# Patient Record
Sex: FEMALE | Race: WHITE | NOT HISPANIC OR LATINO
[De-identification: names, ages, dates, MRNs, and addresses within clinical notes are randomized per-mention and may not be internally consistent; named-entity substitution may affect disease eponyms.]

---

## 2017-05-05 PROBLEM — Z00.00 ENCOUNTER FOR PREVENTIVE HEALTH EXAMINATION: Status: ACTIVE | Noted: 2017-05-05

## 2017-05-16 ENCOUNTER — APPOINTMENT (OUTPATIENT)
Dept: HEMATOLOGY ONCOLOGY | Facility: CLINIC | Age: 82
End: 2017-05-16

## 2017-05-26 ENCOUNTER — OTHER (OUTPATIENT)
Age: 82
End: 2017-05-26

## 2017-06-01 ENCOUNTER — APPOINTMENT (OUTPATIENT)
Dept: HEMATOLOGY ONCOLOGY | Facility: CLINIC | Age: 82
End: 2017-06-01

## 2017-06-01 VITALS
OXYGEN SATURATION: 100 % | DIASTOLIC BLOOD PRESSURE: 55 MMHG | HEIGHT: 63.98 IN | WEIGHT: 137 LBS | SYSTOLIC BLOOD PRESSURE: 138 MMHG | HEART RATE: 57 BPM | TEMPERATURE: 98.7 F | BODY MASS INDEX: 23.39 KG/M2 | RESPIRATION RATE: 16 BRPM

## 2017-06-01 DIAGNOSIS — Z78.9 OTHER SPECIFIED HEALTH STATUS: ICD-10-CM

## 2017-06-02 PROBLEM — Z78.9 NON-SMOKER: Status: ACTIVE | Noted: 2017-06-02

## 2017-06-22 ENCOUNTER — APPOINTMENT (OUTPATIENT)
Dept: HEMATOLOGY ONCOLOGY | Facility: CLINIC | Age: 82
End: 2017-06-22

## 2017-06-22 VITALS
OXYGEN SATURATION: 100 % | TEMPERATURE: 97.9 F | SYSTOLIC BLOOD PRESSURE: 116 MMHG | BODY MASS INDEX: 23.05 KG/M2 | HEIGHT: 63.98 IN | WEIGHT: 134.99 LBS | RESPIRATION RATE: 16 BRPM | HEART RATE: 59 BPM | DIASTOLIC BLOOD PRESSURE: 54 MMHG

## 2017-07-05 ENCOUNTER — CHART COPY (OUTPATIENT)
Age: 82
End: 2017-07-05

## 2017-07-05 ENCOUNTER — OTHER (OUTPATIENT)
Age: 82
End: 2017-07-05

## 2017-07-12 ENCOUNTER — RX RENEWAL (OUTPATIENT)
Age: 82
End: 2017-07-12

## 2017-07-18 ENCOUNTER — RX RENEWAL (OUTPATIENT)
Age: 82
End: 2017-07-18

## 2017-07-20 ENCOUNTER — APPOINTMENT (OUTPATIENT)
Dept: HEMATOLOGY ONCOLOGY | Facility: CLINIC | Age: 82
End: 2017-07-20

## 2017-07-20 VITALS
HEART RATE: 64 BPM | WEIGHT: 134.99 LBS | HEIGHT: 63.98 IN | SYSTOLIC BLOOD PRESSURE: 120 MMHG | RESPIRATION RATE: 16 BRPM | TEMPERATURE: 98.7 F | OXYGEN SATURATION: 98 % | DIASTOLIC BLOOD PRESSURE: 46 MMHG | BODY MASS INDEX: 23.05 KG/M2

## 2017-08-01 ENCOUNTER — RX RENEWAL (OUTPATIENT)
Age: 82
End: 2017-08-01

## 2017-08-03 ENCOUNTER — OUTPATIENT (OUTPATIENT)
Dept: OUTPATIENT SERVICES | Facility: HOSPITAL | Age: 82
LOS: 1 days | End: 2017-08-03
Payer: MEDICARE

## 2017-08-03 PROCEDURE — 78815 PET IMAGE W/CT SKULL-THIGH: CPT | Mod: 26

## 2017-08-03 PROCEDURE — A9587: CPT

## 2017-08-03 PROCEDURE — 78815 PET IMAGE W/CT SKULL-THIGH: CPT

## 2017-08-17 ENCOUNTER — APPOINTMENT (OUTPATIENT)
Dept: HEMATOLOGY ONCOLOGY | Facility: CLINIC | Age: 82
End: 2017-08-17
Payer: MEDICARE

## 2017-08-17 VITALS
DIASTOLIC BLOOD PRESSURE: 43 MMHG | HEART RATE: 66 BPM | HEIGHT: 63.98 IN | RESPIRATION RATE: 16 BRPM | WEIGHT: 134 LBS | OXYGEN SATURATION: 100 % | TEMPERATURE: 98.9 F | BODY MASS INDEX: 22.88 KG/M2 | SYSTOLIC BLOOD PRESSURE: 113 MMHG

## 2017-08-17 PROCEDURE — 99215 OFFICE O/P EST HI 40 MIN: CPT

## 2017-08-28 ENCOUNTER — APPOINTMENT (OUTPATIENT)
Dept: HEMATOLOGY ONCOLOGY | Facility: CLINIC | Age: 82
End: 2017-08-28
Payer: MEDICARE

## 2017-08-28 VITALS
HEART RATE: 76 BPM | SYSTOLIC BLOOD PRESSURE: 122 MMHG | TEMPERATURE: 99.1 F | HEIGHT: 63.98 IN | RESPIRATION RATE: 16 BRPM | OXYGEN SATURATION: 100 % | DIASTOLIC BLOOD PRESSURE: 40 MMHG | WEIGHT: 132.98 LBS | BODY MASS INDEX: 22.7 KG/M2

## 2017-08-28 PROCEDURE — 99214 OFFICE O/P EST MOD 30 MIN: CPT

## 2017-09-05 ENCOUNTER — RX RENEWAL (OUTPATIENT)
Age: 82
End: 2017-09-05

## 2017-09-19 ENCOUNTER — APPOINTMENT (OUTPATIENT)
Dept: HEMATOLOGY ONCOLOGY | Facility: CLINIC | Age: 82
End: 2017-09-19
Payer: MEDICARE

## 2017-09-19 VITALS
HEIGHT: 63.98 IN | SYSTOLIC BLOOD PRESSURE: 116 MMHG | BODY MASS INDEX: 22.71 KG/M2 | OXYGEN SATURATION: 100 % | HEART RATE: 60 BPM | DIASTOLIC BLOOD PRESSURE: 34 MMHG | RESPIRATION RATE: 16 BRPM | TEMPERATURE: 98.7 F | WEIGHT: 133 LBS

## 2017-09-19 DIAGNOSIS — R11.2 NAUSEA WITH VOMITING, UNSPECIFIED: ICD-10-CM

## 2017-09-19 DIAGNOSIS — T45.1X5A NAUSEA WITH VOMITING, UNSPECIFIED: ICD-10-CM

## 2017-09-19 PROCEDURE — 99214 OFFICE O/P EST MOD 30 MIN: CPT

## 2017-09-20 PROBLEM — R11.2 CHEMOTHERAPY INDUCED NAUSEA AND VOMITING: Status: ACTIVE | Noted: 2017-05-26

## 2017-10-12 ENCOUNTER — APPOINTMENT (OUTPATIENT)
Dept: HEMATOLOGY ONCOLOGY | Facility: CLINIC | Age: 82
End: 2017-10-12
Payer: MEDICARE

## 2017-10-12 VITALS
HEART RATE: 55 BPM | BODY MASS INDEX: 22.88 KG/M2 | SYSTOLIC BLOOD PRESSURE: 131 MMHG | HEIGHT: 63.98 IN | DIASTOLIC BLOOD PRESSURE: 80 MMHG | WEIGHT: 134 LBS | RESPIRATION RATE: 16 BRPM | TEMPERATURE: 98.1 F | OXYGEN SATURATION: 100 %

## 2017-10-12 PROCEDURE — 99214 OFFICE O/P EST MOD 30 MIN: CPT

## 2017-10-18 ENCOUNTER — APPOINTMENT (OUTPATIENT)
Dept: HEMATOLOGY ONCOLOGY | Facility: CLINIC | Age: 82
End: 2017-10-18
Payer: MEDICARE

## 2017-10-18 VITALS
HEIGHT: 63.98 IN | RESPIRATION RATE: 16 BRPM | TEMPERATURE: 97.8 F | SYSTOLIC BLOOD PRESSURE: 131 MMHG | WEIGHT: 134 LBS | DIASTOLIC BLOOD PRESSURE: 57 MMHG | OXYGEN SATURATION: 100 % | BODY MASS INDEX: 22.88 KG/M2 | HEART RATE: 73 BPM

## 2017-10-18 PROCEDURE — 99215 OFFICE O/P EST HI 40 MIN: CPT

## 2017-10-18 RX ORDER — ONDANSETRON 4 MG/1
4 TABLET, ORALLY DISINTEGRATING ORAL
Qty: 21 | Refills: 0 | Status: COMPLETED | COMMUNITY
Start: 2017-05-10 | End: 2017-10-18

## 2017-10-18 RX ORDER — LUBIPROSTONE 24 UG/1
24 CAPSULE, GELATIN COATED ORAL TWICE DAILY
Qty: 60 | Refills: 1 | Status: COMPLETED | COMMUNITY
Start: 2017-08-17 | End: 2017-10-18

## 2017-10-18 RX ORDER — SULFAMETHOXAZOLE AND TRIMETHOPRIM 800; 160 MG/1; MG/1
800-160 TABLET ORAL
Qty: 14 | Refills: 0 | Status: COMPLETED | COMMUNITY
Start: 2017-05-05 | End: 2017-10-18

## 2017-11-01 ENCOUNTER — APPOINTMENT (OUTPATIENT)
Dept: HEMATOLOGY ONCOLOGY | Facility: CLINIC | Age: 82
End: 2017-11-01
Payer: MEDICARE

## 2017-11-01 VITALS
SYSTOLIC BLOOD PRESSURE: 136 MMHG | TEMPERATURE: 97.5 F | DIASTOLIC BLOOD PRESSURE: 52 MMHG | OXYGEN SATURATION: 98 % | BODY MASS INDEX: 22.19 KG/M2 | HEIGHT: 63.98 IN | HEART RATE: 54 BPM | RESPIRATION RATE: 16 BRPM | WEIGHT: 129.98 LBS

## 2017-11-01 PROCEDURE — 99214 OFFICE O/P EST MOD 30 MIN: CPT

## 2017-11-01 RX ORDER — PANTOPRAZOLE SODIUM 20 MG/1
20 TABLET, DELAYED RELEASE ORAL TWICE DAILY
Refills: 0 | Status: DISCONTINUED | COMMUNITY
End: 2017-11-01

## 2017-11-28 ENCOUNTER — APPOINTMENT (OUTPATIENT)
Dept: HEMATOLOGY ONCOLOGY | Facility: CLINIC | Age: 82
End: 2017-11-28
Payer: MEDICARE

## 2017-11-28 VITALS
TEMPERATURE: 98 F | HEIGHT: 63.98 IN | SYSTOLIC BLOOD PRESSURE: 137 MMHG | DIASTOLIC BLOOD PRESSURE: 54 MMHG | WEIGHT: 134 LBS | OXYGEN SATURATION: 100 % | RESPIRATION RATE: 16 BRPM | BODY MASS INDEX: 22.88 KG/M2 | HEART RATE: 57 BPM

## 2017-11-28 PROCEDURE — 99214 OFFICE O/P EST MOD 30 MIN: CPT

## 2017-12-05 ENCOUNTER — RX RENEWAL (OUTPATIENT)
Age: 82
End: 2017-12-05

## 2017-12-14 ENCOUNTER — RX RENEWAL (OUTPATIENT)
Age: 82
End: 2017-12-14

## 2017-12-27 ENCOUNTER — APPOINTMENT (OUTPATIENT)
Dept: HEMATOLOGY ONCOLOGY | Facility: CLINIC | Age: 82
End: 2017-12-27
Payer: MEDICARE

## 2017-12-27 VITALS
DIASTOLIC BLOOD PRESSURE: 80 MMHG | HEIGHT: 63.98 IN | WEIGHT: 136 LBS | HEART RATE: 66 BPM | BODY MASS INDEX: 23.22 KG/M2 | SYSTOLIC BLOOD PRESSURE: 140 MMHG | RESPIRATION RATE: 20 BRPM | OXYGEN SATURATION: 100 % | TEMPERATURE: 97.9 F

## 2017-12-27 PROCEDURE — 99215 OFFICE O/P EST HI 40 MIN: CPT

## 2018-01-16 ENCOUNTER — OTHER (OUTPATIENT)
Age: 83
End: 2018-01-16

## 2018-02-01 ENCOUNTER — APPOINTMENT (OUTPATIENT)
Dept: HEMATOLOGY ONCOLOGY | Facility: CLINIC | Age: 83
End: 2018-02-01
Payer: MEDICARE

## 2018-02-01 VITALS
HEIGHT: 63.98 IN | RESPIRATION RATE: 20 BRPM | TEMPERATURE: 98.3 F | BODY MASS INDEX: 23.05 KG/M2 | HEART RATE: 59 BPM | DIASTOLIC BLOOD PRESSURE: 51 MMHG | WEIGHT: 134.99 LBS | SYSTOLIC BLOOD PRESSURE: 143 MMHG | OXYGEN SATURATION: 97 %

## 2018-02-01 PROCEDURE — 99214 OFFICE O/P EST MOD 30 MIN: CPT

## 2018-03-01 ENCOUNTER — APPOINTMENT (OUTPATIENT)
Dept: HEMATOLOGY ONCOLOGY | Facility: CLINIC | Age: 83
End: 2018-03-01
Payer: MEDICARE

## 2018-03-01 VITALS
DIASTOLIC BLOOD PRESSURE: 62 MMHG | BODY MASS INDEX: 22.88 KG/M2 | OXYGEN SATURATION: 98 % | WEIGHT: 134 LBS | HEIGHT: 63.98 IN | RESPIRATION RATE: 22 BRPM | HEART RATE: 70 BPM | TEMPERATURE: 98.2 F | SYSTOLIC BLOOD PRESSURE: 147 MMHG

## 2018-03-01 PROCEDURE — 99214 OFFICE O/P EST MOD 30 MIN: CPT

## 2018-04-04 ENCOUNTER — APPOINTMENT (OUTPATIENT)
Dept: HEMATOLOGY ONCOLOGY | Facility: CLINIC | Age: 83
End: 2018-04-04
Payer: MEDICARE

## 2018-04-04 VITALS
TEMPERATURE: 97.7 F | DIASTOLIC BLOOD PRESSURE: 50 MMHG | HEART RATE: 58 BPM | OXYGEN SATURATION: 98 % | WEIGHT: 132.98 LBS | HEIGHT: 63.98 IN | BODY MASS INDEX: 22.7 KG/M2 | RESPIRATION RATE: 20 BRPM | SYSTOLIC BLOOD PRESSURE: 156 MMHG

## 2018-04-04 PROCEDURE — 99214 OFFICE O/P EST MOD 30 MIN: CPT

## 2018-04-13 ENCOUNTER — OTHER (OUTPATIENT)
Age: 83
End: 2018-04-13

## 2018-05-03 ENCOUNTER — APPOINTMENT (OUTPATIENT)
Dept: HEMATOLOGY ONCOLOGY | Facility: CLINIC | Age: 83
End: 2018-05-03
Payer: MEDICARE

## 2018-05-03 VITALS
TEMPERATURE: 97.9 F | WEIGHT: 134 LBS | DIASTOLIC BLOOD PRESSURE: 49 MMHG | SYSTOLIC BLOOD PRESSURE: 146 MMHG | OXYGEN SATURATION: 99 % | RESPIRATION RATE: 20 BRPM | HEIGHT: 63.98 IN | BODY MASS INDEX: 22.88 KG/M2 | HEART RATE: 61 BPM

## 2018-05-03 PROCEDURE — 99213 OFFICE O/P EST LOW 20 MIN: CPT

## 2018-06-14 ENCOUNTER — APPOINTMENT (OUTPATIENT)
Dept: HEMATOLOGY ONCOLOGY | Facility: CLINIC | Age: 83
End: 2018-06-14
Payer: MEDICARE

## 2018-06-14 VITALS
HEIGHT: 63.98 IN | WEIGHT: 131.99 LBS | HEART RATE: 58 BPM | RESPIRATION RATE: 16 BRPM | BODY MASS INDEX: 22.53 KG/M2 | OXYGEN SATURATION: 100 % | SYSTOLIC BLOOD PRESSURE: 133 MMHG | TEMPERATURE: 97.9 F | DIASTOLIC BLOOD PRESSURE: 61 MMHG

## 2018-06-14 PROCEDURE — 99215 OFFICE O/P EST HI 40 MIN: CPT

## 2018-06-14 RX ORDER — HYDROCHLOROTHIAZIDE 25 MG/1
25 TABLET ORAL
Refills: 0 | Status: DISCONTINUED | COMMUNITY
End: 2018-06-14

## 2018-07-17 ENCOUNTER — APPOINTMENT (OUTPATIENT)
Dept: HEMATOLOGY ONCOLOGY | Facility: CLINIC | Age: 83
End: 2018-07-17
Payer: MEDICARE

## 2018-07-17 VITALS
BODY MASS INDEX: 22.53 KG/M2 | WEIGHT: 132 LBS | SYSTOLIC BLOOD PRESSURE: 155 MMHG | TEMPERATURE: 98.6 F | OXYGEN SATURATION: 99 % | HEART RATE: 62 BPM | HEIGHT: 63.98 IN | DIASTOLIC BLOOD PRESSURE: 64 MMHG | RESPIRATION RATE: 20 BRPM

## 2018-07-17 PROCEDURE — 99214 OFFICE O/P EST MOD 30 MIN: CPT

## 2018-08-16 ENCOUNTER — APPOINTMENT (OUTPATIENT)
Dept: HEMATOLOGY ONCOLOGY | Facility: CLINIC | Age: 83
End: 2018-08-16
Payer: MEDICARE

## 2018-08-16 VITALS
TEMPERATURE: 98.6 F | DIASTOLIC BLOOD PRESSURE: 74 MMHG | RESPIRATION RATE: 16 BRPM | OXYGEN SATURATION: 100 % | HEIGHT: 63.98 IN | BODY MASS INDEX: 22.7 KG/M2 | SYSTOLIC BLOOD PRESSURE: 171 MMHG | HEART RATE: 76 BPM | WEIGHT: 132.98 LBS

## 2018-08-16 PROCEDURE — 99214 OFFICE O/P EST MOD 30 MIN: CPT

## 2018-09-13 ENCOUNTER — RESULT REVIEW (OUTPATIENT)
Age: 83
End: 2018-09-13

## 2018-09-13 ENCOUNTER — APPOINTMENT (OUTPATIENT)
Dept: HEMATOLOGY ONCOLOGY | Facility: CLINIC | Age: 83
End: 2018-09-13
Payer: MEDICARE

## 2018-09-13 VITALS
TEMPERATURE: 97.7 F | RESPIRATION RATE: 16 BRPM | DIASTOLIC BLOOD PRESSURE: 68 MMHG | SYSTOLIC BLOOD PRESSURE: 172 MMHG | HEART RATE: 56 BPM | HEIGHT: 63.98 IN | WEIGHT: 136 LBS | OXYGEN SATURATION: 97 % | BODY MASS INDEX: 23.22 KG/M2

## 2018-09-13 PROCEDURE — 99213 OFFICE O/P EST LOW 20 MIN: CPT

## 2018-10-10 ENCOUNTER — APPOINTMENT (OUTPATIENT)
Dept: HEMATOLOGY ONCOLOGY | Facility: CLINIC | Age: 83
End: 2018-10-10

## 2018-11-29 ENCOUNTER — RESULT REVIEW (OUTPATIENT)
Age: 83
End: 2018-11-29

## 2018-11-29 ENCOUNTER — APPOINTMENT (OUTPATIENT)
Dept: HEMATOLOGY ONCOLOGY | Facility: CLINIC | Age: 83
End: 2018-11-29
Payer: MEDICARE

## 2018-11-29 VITALS
BODY MASS INDEX: 21.51 KG/M2 | WEIGHT: 125.99 LBS | SYSTOLIC BLOOD PRESSURE: 136 MMHG | OXYGEN SATURATION: 99 % | RESPIRATION RATE: 18 BRPM | HEIGHT: 63.98 IN | DIASTOLIC BLOOD PRESSURE: 74 MMHG | HEART RATE: 69 BPM | TEMPERATURE: 97.6 F

## 2018-11-29 PROCEDURE — 99215 OFFICE O/P EST HI 40 MIN: CPT

## 2018-11-29 NOTE — REVIEW OF SYSTEMS
[Constipation] : constipation [Negative] : Allergic/Immunologic [Fever] : no fever [Chills] : no chills [Night Sweats] : no night sweats [Fatigue] : no fatigue [Recent Change In Weight] : ~T no recent weight change [Dry Eyes] : no dryness of the eyes [Vision Problems] : no vision problems [Hoarseness] : no hoarseness [Mucosal Pain] : no mucosal pain [Chest Pain] : no chest pain [Lower Ext Edema] : no lower extremity edema [Shortness Of Breath] : no shortness of breath [Cough] : no cough [Diarrhea] : no diarrhea [Joint Pain] : no joint pain [Muscle Pain] : no muscle pain [Skin Rash] : no skin rash [Skin Wound] : no skin wound [Confused] : no confusion [Difficulty Walking] : no difficulty walking [Insomnia] : no insomnia [Anxiety] : no anxiety [Depression] : no depression [Hot Flashes] : no hot flashes [FreeTextEntry3] : glasses [FreeTextEntry4] : MAGUI [FreeTextEntry7] : well controlled [FreeTextEntry8] : nocturia [FreeTextEntry9] : s/p hip repair with marcella placement

## 2018-11-29 NOTE — CONSULT LETTER
[Dear  ___] : Dear  [unfilled], [Consult Letter:] : I had the pleasure of evaluating your patient, [unfilled]. [Please see my note below.] : Please see my note below. [Consult Closing:] : Thank you very much for allowing me to participate in the care of this patient.  If you have any questions, please do not hesitate to contact me. [Sincerely,] : Sincerely, [DrRoel  ___] : Dr. HERNÁNDEZ [FreeTextEntry3] : Mandie Jackson MD\par [facility] [place] [facility] [place]\par

## 2018-11-29 NOTE — DISCUSSION/SUMMARY
[FreeTextEntry1] : Patient's daughter called to report that patient had fallen while going to her mailbox and being distracted. She denies loss of consciousness or sustained injury. She was advised to monitor and consider ED if complications develop.  She will follow up with her PCP next week.

## 2018-11-29 NOTE — RESULTS/DATA
[FreeTextEntry1] : labs from8/16/ 2018 WBC 4.3 hemogloin hemoglobin 11.5 hematocrit 35 platelets 258,000 sodium 140 calcium 10.3\par 8/18 Chromgranin pending, 7/18 chromogranin 683  (prior 1163)\par 6/18 Chromogranin 1163

## 2018-11-29 NOTE — ASSESSMENT
[FreeTextEntry1] : 1. Pancreatic neuroendocrine tumor with duodenal ulcers  \par -Somatulin  60 mg q 4 weeks since  6/22/17\par -  Chromogranin 516 - stable\par - no diarrhea\par - MRI stable Aug 2018\par \par Anemia\par Hemoglobin stable- 11.1\par Recent transfusion following hip fx\par - no Aranesp or Iron needed  ( last IV iron Jan 2018) \par \par Vitamin B12 deficiency -  continue oral B12. \par \par Constipation - resolved \par \par Osteoporosis- Reclast last dose June.\par \par \par Patient in assisted living after recent hip fx/ ORIF\par \par \par

## 2018-11-29 NOTE — HISTORY OF PRESENT ILLNESS
[Therapy: ___] : Therapy: [unfilled] [1 - Distress Level] : Distress Level: 1 [de-identified] : Patient is an 89 year old new consult for newly diagnosed PNET Pt .had a h/o abd pain and underwent an abdominal u/s  on 04/25/2017.Impression showed patient was post cholecystectomy. There is dilation of the extrahepatic duct. Mild intrahepatic ductal dilation is also appreciated. Further evaluation recommended. Pt. then underwent an MRI of abd w/ w/o contrast that showed moderate intra and extrahepatic biliary ductal dilation and choledocholithiasis at least 3 common bile duct stones as described. A 2.3 x 2.3 uncinate process pancreatic mass with imaging features favoring a pancreatic neuroendocrine tumor rather than adenocarcinoma. Patient underwent a fine needle aspiration for cytology  of the pancreatic uncinate process mass that showed well differentiated pancreatic neuroendocrine tumor. Immunohistochemical stains for chromogranin, synaptophysin and Ki-67 wer eperformed on the cell block. Chromogranin and synaphysin are positive and Ki-67 shows less that 3%  staining.   [FreeTextEntry1] : Somatuline 60 mg [de-identified] : Patient presents today for PNET, anemia.  She is s/p hip repair (Right) with marcella placement early November currently at [facility].  Patient states she did well post op however had 2 units PRBC's she believes it was post op.  Patient has lost approximately 10 lbs since last visit, her appetite is poor, intake is poor as well.

## 2018-11-29 NOTE — REASON FOR VISIT
[Follow-Up Visit] : a follow-up [Family Member] : family member [Other: _____] : [unfilled] [FreeTextEntry2] : PNET, Anemia, constipation

## 2018-12-18 ENCOUNTER — APPOINTMENT (OUTPATIENT)
Dept: GERIATRICS | Facility: CLINIC | Age: 83
End: 2018-12-18
Payer: MEDICARE

## 2018-12-18 VITALS — HEART RATE: 60 BPM | SYSTOLIC BLOOD PRESSURE: 130 MMHG | RESPIRATION RATE: 18 BRPM | DIASTOLIC BLOOD PRESSURE: 60 MMHG

## 2018-12-18 DIAGNOSIS — I73.9 PERIPHERAL VASCULAR DISEASE, UNSPECIFIED: ICD-10-CM

## 2018-12-18 DIAGNOSIS — M79.674 PAIN IN RIGHT TOE(S): ICD-10-CM

## 2018-12-18 PROCEDURE — 99214 OFFICE O/P EST MOD 30 MIN: CPT

## 2018-12-21 ENCOUNTER — APPOINTMENT (OUTPATIENT)
Dept: FAMILY MEDICINE | Facility: HOME HEALTH | Age: 83
End: 2018-12-21
Payer: MEDICARE

## 2018-12-21 VITALS
BODY MASS INDEX: 22.63 KG/M2 | DIASTOLIC BLOOD PRESSURE: 62 MMHG | SYSTOLIC BLOOD PRESSURE: 132 MMHG | HEART RATE: 65 BPM | RESPIRATION RATE: 15 BRPM | WEIGHT: 123 LBS | HEIGHT: 62 IN

## 2018-12-21 VITALS
WEIGHT: 123 LBS | RESPIRATION RATE: 15 BRPM | SYSTOLIC BLOOD PRESSURE: 132 MMHG | HEIGHT: 62 IN | HEART RATE: 65 BPM | BODY MASS INDEX: 22.63 KG/M2 | DIASTOLIC BLOOD PRESSURE: 62 MMHG

## 2018-12-21 NOTE — REVIEW OF SYSTEMS
[Fatigue] : fatigue [Recent Change In Weight] : ~T recent weight change [Hearing Loss] : hearing loss [Heartburn] : heartburn [Joint Pain] : joint pain [Joint Stiffness] : joint stiffness [Back Pain] : back pain [Dizziness] : dizziness [Unsteady Walking] : ataxia [Negative] : Psychiatric [Fever] : no fever [Night Sweats] : no night sweats [Discharge] : no discharge [Pain] : no pain [Redness] : no redness [Itching] : no itching [Earache] : no earache [Nosebleed] : no nosebleeds [Postnasal Drip] : no postnasal drip [Chest Pain] : no chest pain [Palpitations] : no palpitations [Orthopnea] : no orthopnea [Paroysmal Nocturnal Dyspnea] : no paroysmal nocturnal dyspnea [Shortness Of Breath] : no shortness of breath [Wheezing] : no wheezing

## 2018-12-21 NOTE — HEALTH RISK ASSESSMENT
[Fair] : ~his/her~ current health as fair  [Intercurrent ED visits] : went to ED [Intercurrent hospitalizations] : was admitted to the hospital  [Intercurrent Urgi Care visits] : went to urgent care [] : No [de-identified] : oncology [de-identified] : high risk

## 2018-12-21 NOTE — PHYSICAL EXAM
[No Acute Distress] : no acute distress [Normal Sclera/Conjunctiva] : normal sclera/conjunctiva [PERRL] : pupils equal round and reactive to light [Normal Outer Ear/Nose] : the outer ears and nose were normal in appearance [Normal Rate] : normal rate  [Regular Rhythm] : with a regular rhythm [No Carotid Bruits] : no carotid bruits [No Rash] : no rash [de-identified] : Frail elderly womnan [de-identified] : Decreased ROM [de-identified] : Decreasedpulmonarysounds [de-identified] : deferred [de-identified] : decreased ROM

## 2018-12-21 NOTE — HISTORY OF PRESENT ILLNESS
[FreeTextEntry1] : New patient/multiple medical problems [de-identified] : New resident in [id number] unit, initial visit to establish medical care.\par the patient is a frail 89 years woman with multiple medical problems\par She is hard of hearing

## 2018-12-21 NOTE — PLAN
[FreeTextEntry1] : The patient with multiple medical problems, not in distress\par Educated about fall prevention\par Will repeat blood work to assess her anemia and kidney funcrtion\par F/u oncology, has an appointment in one week\par Will see the patient in health center in 3 mo\par

## 2018-12-26 ENCOUNTER — APPOINTMENT (OUTPATIENT)
Dept: GERIATRICS | Facility: CLINIC | Age: 83
End: 2018-12-26

## 2018-12-27 ENCOUNTER — RESULT REVIEW (OUTPATIENT)
Age: 83
End: 2018-12-27

## 2018-12-27 ENCOUNTER — APPOINTMENT (OUTPATIENT)
Dept: HEMATOLOGY ONCOLOGY | Facility: CLINIC | Age: 83
End: 2018-12-27
Payer: MEDICARE

## 2018-12-27 PROCEDURE — 99214 OFFICE O/P EST MOD 30 MIN: CPT

## 2019-01-16 ENCOUNTER — RX RENEWAL (OUTPATIENT)
Age: 84
End: 2019-01-16

## 2019-01-23 ENCOUNTER — RX RENEWAL (OUTPATIENT)
Age: 84
End: 2019-01-23

## 2019-01-28 ENCOUNTER — RX RENEWAL (OUTPATIENT)
Age: 84
End: 2019-01-28

## 2019-01-30 ENCOUNTER — RESULT REVIEW (OUTPATIENT)
Age: 84
End: 2019-01-30

## 2019-01-30 ENCOUNTER — APPOINTMENT (OUTPATIENT)
Dept: HEMATOLOGY ONCOLOGY | Facility: CLINIC | Age: 84
End: 2019-01-30
Payer: MEDICARE

## 2019-01-30 VITALS
HEIGHT: 62.01 IN | HEART RATE: 77 BPM | RESPIRATION RATE: 18 BRPM | BODY MASS INDEX: 24.35 KG/M2 | TEMPERATURE: 98.3 F | SYSTOLIC BLOOD PRESSURE: 119 MMHG | OXYGEN SATURATION: 99 % | DIASTOLIC BLOOD PRESSURE: 69 MMHG | WEIGHT: 134 LBS

## 2019-01-30 PROCEDURE — 99214 OFFICE O/P EST MOD 30 MIN: CPT

## 2019-02-07 ENCOUNTER — APPOINTMENT (OUTPATIENT)
Dept: GERIATRICS | Facility: CLINIC | Age: 84
End: 2019-02-07
Payer: MEDICARE

## 2019-02-15 ENCOUNTER — RESULT REVIEW (OUTPATIENT)
Age: 84
End: 2019-02-15

## 2019-02-15 NOTE — PLAN
[FreeTextEntry1] : The patient is alert, aware of her medical problems\par pending oncology evaluation with dr Landry at [facility]\par The patient educated about fall prevention\par

## 2019-02-15 NOTE — ASSESSMENT
[FreeTextEntry1] : 89 year old female with Pancreatic neuroendocrine tumor with duodenal ulcers  \par -Somatulin  60 mg q 4 weeks since  6/22/17\par - Chromogranin decrased, continue current dose of 60 mg.\par - BM regular.\par - MRI stable Aug 2018\par \par Anemia\par Hemoglobin stable- 11.4 \par Ferritn 458  ( last IV iron Jan 2018) \par \par Vitamin B12 620  continue oral B12. \par \par Constipation - resolved \par \par Osteoporosis- last Reclast 6/18, due next visit.\par \par \par Patient in assisted living after recent hip fx/ ORIF\par \par Monitor CBC, Chem Profile, Chromagranin\par \par History of present illness, review of systems, physical exam and treatment plan reviewed with . \par \par Office visit in 4  weeks for Somatuline and Reclast, or prn for new or worsening symptoms. \par \par \par

## 2019-02-15 NOTE — REVIEW OF SYSTEMS
[Hearing Loss] : hearing loss [Dizziness] : dizziness [Unsteady Walking] : ataxia [Negative] : Gastrointestinal [Chills] : no chills [Dry Eyes] : no dryness of the eyes [Vision Problems] : no vision problems [Hoarseness] : no hoarseness [Mucosal Pain] : no mucosal pain [Lower Ext Edema] : no lower extremity edema [Diarrhea] : no diarrhea [Muscle Pain] : no muscle pain [Skin Wound] : no skin wound [Confused] : no confusion [Difficulty Walking] : no difficulty walking [Insomnia] : no insomnia [Hot Flashes] : no hot flashes [FreeTextEntry3] : glasses [FreeTextEntry4] : MAGUI [FreeTextEntry8] : nocturia [FreeTextEntry9] : s/p hip repair with marcella placement [Fever] : no fever [Fatigue] : no fatigue [Night Sweats] : no night sweats [Recent Change In Weight] : ~T no recent weight change [Earache] : no earache [Nosebleed] : no nosebleeds [Nasal Discharge] : no nasal discharge [Postnasal Drip] : no postnasal drip [Chest Pain] : no chest pain [Palpitations] : no palpitations [Paroysmal Nocturnal Dyspnea] : no paroysmal nocturnal dyspnea [Shortness Of Breath] : no shortness of breath [Cough] : no cough [Constipation] : no constipation [Diarrhea] : diarrhea [Dysuria] : no dysuria [Joint Pain] : no joint pain [Joint Swelling] : no joint swelling [Itching] : no itching [Skin Rash] : no skin rash [Headache] : no headache [Memory Loss] : no memory loss [Anxiety] : no anxiety [Depression] : no depression [Easy Bleeding] : no easy bleeding [Easy Bruising] : no easy bruising [FreeTextEntry2] : Frail looking elderly patient

## 2019-02-15 NOTE — RESULTS/DATA
[FreeTextEntry1] : January 30, 2018\par wBC 4.9\par Hemoglobin 11.4\par Hematocrit 35.3\par Platelets 302,000\par \par Chromagranin 975 (prev 1871)\par \par Ferritin 458

## 2019-02-15 NOTE — RESULTS/DATA
[FreeTextEntry1] : 12/27/18\par WBC 6.1\par Hemoglobin 10.9\par Hematocrit 33.9\par Platelets 307,000

## 2019-02-15 NOTE — HISTORY OF PRESENT ILLNESS
[Therapy: ___] : Therapy: [unfilled] [1 - Distress Level] : Distress Level: 1 [de-identified] : Patient is an 89 year old new consult for newly diagnosed PNET Pt .had a h/o abd pain and underwent an abdominal u/s  on 04/25/2017.Impression showed patient was post cholecystectomy. There is dilation of the extrahepatic duct. Mild intrahepatic ductal dilation is also appreciated. Further evaluation recommended. Pt. then underwent an MRI of abd w/ w/o contrast that showed moderate intra and extrahepatic biliary ductal dilation and choledocholithiasis at least 3 common bile duct stones as described. A 2.3 x 2.3 uncinate process pancreatic mass with imaging features favoring a pancreatic neuroendocrine tumor rather than adenocarcinoma. Patient underwent a fine needle aspiration for cytology  of the pancreatic uncinate process mass that showed well differentiated pancreatic neuroendocrine tumor. Immunohistochemical stains for chromogranin, synaptophysin and Ki-67 wer eperformed on the cell block. Chromogranin and synaphysin are positive and Ki-67 shows less that 3%  staining.   [FreeTextEntry1] : Somatuline 60 mg [de-identified] : Patient presents today for PNET, anemia.  She is currently living at [facility].  She is still having some discomfort on her right foot because of an ulcer on the dorsum of the foot.She is having her legs wrapped daily for swelling. Her bowel movements are regular. Her weight is stable. Otherwise she denies complaints. She denies rash, fever, infections, bleeding, bruising, nausea, vomiting, cough, shortness of breath, chest pain, change in bowel movement, headache or dizziness.

## 2019-02-15 NOTE — HISTORY OF PRESENT ILLNESS
[Therapy: ___] : Therapy: [unfilled] [1 - Distress Level] : Distress Level: 1 [de-identified] : Patient is an 89 year old new consult for newly diagnosed PNET Pt .had a h/o abd pain and underwent an abdominal u/s  on 04/25/2017.Impression showed patient was post cholecystectomy. There is dilation of the extrahepatic duct. Mild intrahepatic ductal dilation is also appreciated. Further evaluation recommended. Pt. then underwent an MRI of abd w/ w/o contrast that showed moderate intra and extrahepatic biliary ductal dilation and choledocholithiasis at least 3 common bile duct stones as described. A 2.3 x 2.3 uncinate process pancreatic mass with imaging features favoring a pancreatic neuroendocrine tumor rather than adenocarcinoma. Patient underwent a fine needle aspiration for cytology  of the pancreatic uncinate process mass that showed well differentiated pancreatic neuroendocrine tumor. Immunohistochemical stains for chromogranin, synaptophysin and Ki-67 wer eperformed on the cell block. Chromogranin and synaphysin are positive and Ki-67 shows less that 3%  staining.   [FreeTextEntry1] : Somatuline 60 mg [de-identified] : Patient presents today for PNET, anemia.  She is currently living at [facility].  She is still having some discomfort on her right foot because of an ulcer on the dorsum of the foot.She is having her legs wrapped daily for swelling. Her bowel movements are regular. Her weight is stable. Otherwise she denies complaints. She denies rash, fever, infections, bleeding, bruising, nausea, vomiting, cough, shortness of breath, chest pain, change in bowel movement, headache or dizziness.

## 2019-02-15 NOTE — HISTORY OF PRESENT ILLNESS
[Therapy: ___] : Therapy: [unfilled] [1 - Distress Level] : Distress Level: 1 [de-identified] : Patient presents today for PNET, anemia.  She is currently living at [facility]. She no longer has issues with her ulcer on the dorsum of her foot.  She elevates her legs daily for swelling. She regained 11 pounds. Her BM have been normal. 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  She is seeing  in [place].    Otherwise she denies complaint. She denies rash, fever, infections, bleeding, bruising, nausea,vomiting,cough, SOB, chest pain, change in BM, headache or dizziness.  [FreeTextEntry1] : New patient, multiple medical problems\par  [de-identified] : Patient is an 89 year old new consult for newly diagnosed PNET Pt.had a h/o abd pain and underwent an abdominal u/s on 04/25/2017.Impression showed patient was post cholecystectomy. There is dilation of the extrahepatic duct. Mild intrahepatic ductal dilation is also appreciated. Further evaluation recommended. Pt. then underwent an MRI of abd w/ w/o contrast that showed moderate intra and extrahepatic biliary ductal dilation and choledocholithiasis at least 3 common bile duct stones as described. A 2.3 x 2.3 uncinate process pancreatic mass with imaging features favoring a pancreatic neuroendocrine tumor rather than adenocarcinoma. Patient underwent a fine needle aspiration for cytology of the pancreatic uncinate process mass that showed well differentiated pancreatic neuroendocrine tumor. Immunohistochemical stains for chromogranin, synaptophysin and Ki-67 wer eperformed on the cell block. Chromogranin and synaphysin are positive and Ki-67 shows less that 3% staining. \par \par

## 2019-02-15 NOTE — PHYSICAL EXAM
[Restricted in physically strenuous activity but ambulatory and able to carry out work of a light or sedentary nature] : Status 1- Restricted in physically strenuous activity but ambulatory and able to carry out work of a light or sedentary nature, e.g., light house work, office work [Normal] : affect appropriate [No Acute Distress] : no acute distress [Normal Sclera/Conjunctiva] : normal sclera/conjunctiva [Normal Outer Ear/Nose] : the outer ears and nose were normal in appearance [No Respiratory Distress] : no respiratory distress  [Clear to Auscultation] : lungs were clear to auscultation bilaterally [Normal Rate] : normal rate  [Memory Grossly Normal] : memory grossly normal [de-identified] : Frail appearing [de-identified] : decreased ROM [de-identified] : decreased pulmonary sounds [de-identified] : defered [de-identified] : non palpable

## 2019-02-15 NOTE — ASSESSMENT
[FreeTextEntry1] : 89 year old with Pancreatic neuroendocrine tumor with duodenal ulcers  \par - Somatulin  60 mg q 4 weeks since  6/22/17\par -  Chromogranin rising. Consider a increase to 90 mg at next visit.\par - no diarrhea\par - MRI stable Aug 2018\par \par Anemia\par Hemoglobin stable- 10.9\par  - Ferritin 445 (last IV iron January 2018)\par - no Aranesp or Iron needed  ( last IV iron Jan 2018) \par \par Vitamin B12 620   continue oral B12. \par \par Constipation - resolved \par \par Osteoporosis- Reclast last dose June.\par \par \par Patient in assisted living after recent hip fx/ ORIF\par \par Monitor CBC,Chem Profile, Chromagranin\par \par History of present illness, review of systems, physical exam and treatment plan reviewed with . \par \par Office visit in 4 weeks or prn for new or worsening symptoms. \par \par \par

## 2019-02-15 NOTE — CONSULT LETTER
[Dear  ___] : Dear  [unfilled], [Consult Letter:] : I had the pleasure of evaluating your patient, [unfilled]. [Please see my note below.] : Please see my note below. [Consult Closing:] : Thank you very much for allowing me to participate in the care of this patient.  If you have any questions, please do not hesitate to contact me. [Sincerely,] : Sincerely, [DrRoel  ___] : Dr. HERNÁNDEZ [FreeTextEntry3] : Mandie Jackson MD\par [facility] [place] [facility]\par

## 2019-02-27 ENCOUNTER — RESULT REVIEW (OUTPATIENT)
Age: 84
End: 2019-02-27

## 2019-02-27 ENCOUNTER — APPOINTMENT (OUTPATIENT)
Dept: HEMATOLOGY ONCOLOGY | Facility: CLINIC | Age: 84
End: 2019-02-27
Payer: MEDICARE

## 2019-02-27 VITALS
TEMPERATURE: 98 F | OXYGEN SATURATION: 98 % | HEIGHT: 62.01 IN | BODY MASS INDEX: 24.17 KG/M2 | RESPIRATION RATE: 18 BRPM | SYSTOLIC BLOOD PRESSURE: 165 MMHG | WEIGHT: 133 LBS | DIASTOLIC BLOOD PRESSURE: 69 MMHG | HEART RATE: 66 BPM

## 2019-02-27 PROCEDURE — 99214 OFFICE O/P EST MOD 30 MIN: CPT

## 2019-02-27 NOTE — CONSULT LETTER
[Dear  ___] : Dear  [unfilled], [Consult Letter:] : I had the pleasure of evaluating your patient, [unfilled]. [Please see my note below.] : Please see my note below. [Consult Closing:] : Thank you very much for allowing me to participate in the care of this patient.  If you have any questions, please do not hesitate to contact me. [Sincerely,] : Sincerely, [DrRoel  ___] : Dr. HERNÁNDEZ [FreeTextEntry3] : Mandie Jackson MD\par [facility]\par

## 2019-02-27 NOTE — RESULTS/DATA
[FreeTextEntry1] : 2/27/19:\par WBC 6.1\par HGB 10.9\par HCT 34.1\par ,000\par \par 1/19 Chromagranin 975 (prev 1871), current pending.\par \par Ferritin 458

## 2019-02-27 NOTE — PLAN
[FreeTextEntry1] : The patient is alert, aware of her medical problems\par pending oncology evaluation with dr Landry at [place]\par The patient educated about fall prevention\par

## 2019-02-27 NOTE — REVIEW OF SYSTEMS
[Hearing Loss] : hearing loss [Dizziness] : dizziness [Unsteady Walking] : ataxia [Negative] : Heme/Lymph [Chills] : no chills [Dry Eyes] : no dryness of the eyes [Vision Problems] : no vision problems [Hoarseness] : no hoarseness [Mucosal Pain] : no mucosal pain [Lower Ext Edema] : no lower extremity edema [Diarrhea] : no diarrhea [Muscle Pain] : no muscle pain [Skin Wound] : no skin wound [Confused] : no confusion [Difficulty Walking] : no difficulty walking [Insomnia] : no insomnia [Hot Flashes] : no hot flashes [FreeTextEntry3] : glasses [FreeTextEntry4] : MAGUI [FreeTextEntry8] : nocturia [FreeTextEntry9] : s/p hip repair with marcella placement [de-identified] : SCC  crusted lesions of the scalp [Fever] : no fever [Fatigue] : no fatigue [Night Sweats] : no night sweats [Recent Change In Weight] : ~T no recent weight change [Earache] : no earache [Nosebleed] : no nosebleeds [Nasal Discharge] : no nasal discharge [Postnasal Drip] : no postnasal drip [Chest Pain] : no chest pain [Palpitations] : no palpitations [Paroysmal Nocturnal Dyspnea] : no paroysmal nocturnal dyspnea [Shortness Of Breath] : no shortness of breath [Cough] : no cough [Constipation] : no constipation [Diarrhea] : diarrhea [Dysuria] : no dysuria [Joint Pain] : no joint pain [Joint Swelling] : no joint swelling [Itching] : no itching [Skin Rash] : no skin rash [Headache] : no headache [Memory Loss] : no memory loss [Anxiety] : no anxiety [Depression] : no depression [Easy Bleeding] : no easy bleeding [Easy Bruising] : no easy bruising [FreeTextEntry2] : Frail looking elderly patient

## 2019-02-27 NOTE — ASSESSMENT
[FreeTextEntry1] : 89 year old female with Pancreatic neuroendocrine tumor with duodenal ulcers  \par -Somatulin  60 mg q 4 weeks since  6/22/17\par - Chromogranin decreased, continue current dose of 60 mg.\par - BM regular.\par - MRI stable Aug 2018\par \par Anemia\par Hemoglobin stable- 10.9 \par Ferritn 458  ( last IV iron Jan 2018) \par \par Vitamin B12 620  continue oral B12. \par \par Constipation - resolved \par \par Osteoporosis- last Reclast 6/18, due next visit.\par \par \par Patient in assisted living after recent hip fx/ ORIF\par \par SCC of the scalp\par - awaiting path results from dermatologist, will refer to Moh's surgeon and consideration for radiation oncology, .\par \par Monitor CBC, Chem Profile, Chromagranin\par \par History of present illness, review of systems, physical exam and treatment plan reviewed with . \par \par Office visit in 4  weeks for Somatuline and Reclast, or prn for new or worsening symptoms. \par \par \par

## 2019-02-27 NOTE — HEALTH RISK ASSESSMENT
[Fair] : ~his/her~ current health as fair  [Good] : ~his/her~  mood as  good [Intercurrent ED visits] : went to ED [Intercurrent hospitalizations] : was admitted to the hospital  [1] : 2) Feeling down, depressed, or hopeless for several days (1) [FreeTextEntry1] : her health [] : No [de-identified] : pending oncology [de-identified] : increased risk

## 2019-02-27 NOTE — PHYSICAL EXAM
[Restricted in physically strenuous activity but ambulatory and able to carry out work of a light or sedentary nature] : Status 1- Restricted in physically strenuous activity but ambulatory and able to carry out work of a light or sedentary nature, e.g., light house work, office work [Normal] : affect appropriate [No Acute Distress] : no acute distress [Normal Sclera/Conjunctiva] : normal sclera/conjunctiva [Normal Outer Ear/Nose] : the outer ears and nose were normal in appearance [No Respiratory Distress] : no respiratory distress  [Clear to Auscultation] : lungs were clear to auscultation bilaterally [Normal Rate] : normal rate  [Memory Grossly Normal] : memory grossly normal [de-identified] : Crusted, large scalp lesions. [de-identified] : Frail appearing [de-identified] : decreased ROM [de-identified] : decreased pulmonary sounds [de-identified] : defered [de-identified] : non palpable

## 2019-02-27 NOTE — HISTORY OF PRESENT ILLNESS
[Therapy: ___] : Therapy: [unfilled] [1 - Distress Level] : Distress Level: 1 [de-identified] : Patient presents today for PNET, anemia.  She is currently living at [facility]. She had biopsy of her scalp with Dr.Derick Clifton. She had a squamous cell lesion on the top of her head.  She may require radiation to the scalp. She also had right lower extremity doppler for leg swelling,redness which was negative.  Otherwise she denies complaint. She denies rash, fever, infections, bleeding, bruising, nausea,vomiting,cough, SOB, chest pain, change in BM, headache or dizziness.  [FreeTextEntry1] : New patient, multiple medical problems\par  [de-identified] : Patient is an 89 year old new consult for newly diagnosed PNET Pt.had a h/o abd pain and underwent an abdominal u/s on 04/25/2017.Impression showed patient was post cholecystectomy. There is dilation of the extrahepatic duct. Mild intrahepatic ductal dilation is also appreciated. Further evaluation recommended. Pt. then underwent an MRI of abd w/ w/o contrast that showed moderate intra and extrahepatic biliary ductal dilation and choledocholithiasis at least 3 common bile duct stones as described. A 2.3 x 2.3 uncinate process pancreatic mass with imaging features favoring a pancreatic neuroendocrine tumor rather than adenocarcinoma. Patient underwent a fine needle aspiration for cytology of the pancreatic uncinate process mass that showed well differentiated pancreatic neuroendocrine tumor. Immunohistochemical stains for chromogranin, synaptophysin and Ki-67 wer eperformed on the cell block. Chromogranin and synaphysin are positive and Ki-67 shows less that 3% staining. \par \par

## 2019-03-06 ENCOUNTER — APPOINTMENT (OUTPATIENT)
Dept: RADIATION ONCOLOGY | Facility: CLINIC | Age: 84
End: 2019-03-06
Payer: MEDICARE

## 2019-03-06 PROCEDURE — 99205 OFFICE O/P NEW HI 60 MIN: CPT | Mod: 25

## 2019-03-06 NOTE — VITALS
[Maximal Pain Intensity: 4/10] : 4/10 [Least Pain Intensity: 0/10] : 0/10 [90: Able to carry normal activity; minor signs or symptoms of disease.] : 90: Able to carry normal activity; minor signs or symptoms of disease.  [Date: ____________] : Patient's last distress assessment performed on [unfilled]. [5 - Distress Level] : Distress Level: 5

## 2019-03-11 NOTE — PHYSICAL EXAM
[General Appearance - Alert] : alert [General Appearance - In No Acute Distress] : in no acute distress [Thin] : thin [Normal] : no palpable adenopathy [de-identified] : Scalp lesion measures roughly 3x4cm and is crusted but view is obscured by copious amounts of vaseline as well

## 2019-03-11 NOTE — DISEASE MANAGEMENT
[Clinical] : TNM Stage: c [N/A] : Currently not applicable [FreeTextEntry4] : basal cell carcinoma [TTNM] : x [NTNM] : x [MTNM] : x

## 2019-03-11 NOTE — HISTORY OF PRESENT ILLNESS
[FreeTextEntry1] : Ms. 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 \par \par Ms. Wechsler initially present with BCC of the scalp approximately 3 years ago for which she had Moh's surgery (unable to recall dermatologist name and exact note). She had routine follow-up with Dr. 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 She reports increased growth of a crust like lesion to the scalp and increasing pain and saw Dr. Ronak Clifton for further evaluation. 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 She otherwise offers no other complaints at this time. She is present today to discuss further management with radiation therapy.

## 2019-03-11 NOTE — LETTER CLOSING
[Consult Closing:] : Thank you for allowing me to participate in the care of this patient.  If you have any questions, please do not hesitate to contact me. [Sincerely yours,] : Sincerely yours, [FreeTextEntry3] : Fernanda Michael MD

## 2019-03-11 NOTE — REVIEW OF SYSTEMS
[Patient Intake Form Reviewed] : Patient intake form was reviewed [Negative] : Heme/Lymph [de-identified] : large lesion to top of scalp

## 2019-03-15 ENCOUNTER — APPOINTMENT (OUTPATIENT)
Dept: GERIATRICS | Facility: CLINIC | Age: 84
End: 2019-03-15
Payer: MEDICARE

## 2019-03-15 ENCOUNTER — RECORD ABSTRACTING (OUTPATIENT)
Age: 84
End: 2019-03-15

## 2019-03-15 VITALS — RESPIRATION RATE: 20 BRPM | DIASTOLIC BLOOD PRESSURE: 70 MMHG | HEART RATE: 70 BPM | SYSTOLIC BLOOD PRESSURE: 140 MMHG

## 2019-03-15 DIAGNOSIS — Z86.39 PERSONAL HISTORY OF OTHER ENDOCRINE, NUTRITIONAL AND METABOLIC DISEASE: ICD-10-CM

## 2019-03-15 DIAGNOSIS — Z86.79 PERSONAL HISTORY OF OTHER DISEASES OF THE CIRCULATORY SYSTEM: ICD-10-CM

## 2019-03-15 DIAGNOSIS — Z87.19 PERSONAL HISTORY OF OTHER DISEASES OF THE DIGESTIVE SYSTEM: ICD-10-CM

## 2019-03-15 PROCEDURE — 99213 OFFICE O/P EST LOW 20 MIN: CPT

## 2019-03-15 RX ORDER — BACITRACIN 500 [USP'U]/G
500 OINTMENT OPHTHALMIC
Qty: 4 | Refills: 0 | Status: DISCONTINUED | COMMUNITY
Start: 2018-05-11 | End: 2019-03-15

## 2019-03-15 NOTE — REVIEW OF SYSTEMS
[Eyesight Problems] : no eyesight problems [Discharge From Eyes] : no purulent discharge from the eyes [Dry Eyes] : dryness of the eyes [Negative] : Constitutional [FreeTextEntry3] : no visual disturbances

## 2019-03-15 NOTE — PHYSICAL EXAM
[General Appearance - Alert] : alert [General Appearance - In No Acute Distress] : in no acute distress [Sclera] : the sclera and conjunctiva were normal [PERRL With Normal Accommodation] : pupils were equal in size, round, and reactive to light [Extraocular Movements] : extraocular movements were intact [FreeTextEntry1] : no drainage, no flaking of lids, sclera clr

## 2019-03-15 NOTE — HISTORY OF PRESENT ILLNESS
[FreeTextEntry1] : c/o "burning" in her eyes, \par thinks she has dry eyes\par using a commercial eyelid cleanser in her room\par also has bacitracin opthalmic ointment in her room

## 2019-03-15 NOTE — ASSESSMENT
[FreeTextEntry1] : Stop eyelid scrub pads which could possibly be irritating her eyes\par use warm clean washcloth or moisten cotton pads to clean eyes \par daily \par D/C bacitracin, no sign of infection\par may require opthalmolgy referral if not improving within 1-2 wks or if any worsening\par symptoms

## 2019-03-26 ENCOUNTER — APPOINTMENT (OUTPATIENT)
Dept: GERIATRICS | Facility: CLINIC | Age: 84
End: 2019-03-26
Payer: MEDICARE

## 2019-03-26 VITALS — DIASTOLIC BLOOD PRESSURE: 60 MMHG | HEART RATE: 67 BPM | SYSTOLIC BLOOD PRESSURE: 118 MMHG | RESPIRATION RATE: 18 BRPM

## 2019-03-26 DIAGNOSIS — H04.123 DRY EYE SYNDROME OF BILATERAL LACRIMAL GLANDS: ICD-10-CM

## 2019-03-26 PROCEDURE — 99213 OFFICE O/P EST LOW 20 MIN: CPT

## 2019-03-27 ENCOUNTER — RESULT REVIEW (OUTPATIENT)
Age: 84
End: 2019-03-27

## 2019-03-27 ENCOUNTER — APPOINTMENT (OUTPATIENT)
Dept: HEMATOLOGY ONCOLOGY | Facility: CLINIC | Age: 84
End: 2019-03-27
Payer: MEDICARE

## 2019-03-27 VITALS
TEMPERATURE: 98.1 F | HEART RATE: 60 BPM | WEIGHT: 132 LBS | OXYGEN SATURATION: 98 % | BODY MASS INDEX: 23.98 KG/M2 | DIASTOLIC BLOOD PRESSURE: 79 MMHG | SYSTOLIC BLOOD PRESSURE: 176 MMHG | RESPIRATION RATE: 20 BRPM | HEIGHT: 62.01 IN

## 2019-03-27 PROCEDURE — 99214 OFFICE O/P EST MOD 30 MIN: CPT

## 2019-03-28 RX ORDER — PANTOPRAZOLE 40 MG/1
40 TABLET, DELAYED RELEASE ORAL DAILY
Qty: 30 | Refills: 1 | Status: DISCONTINUED | COMMUNITY
Start: 2017-07-18 | End: 2019-03-28

## 2019-03-28 RX ORDER — CYANOCOBALAMIN (VITAMIN B-12) 100 MCG
100 TABLET ORAL
Refills: 0 | Status: DISCONTINUED | COMMUNITY
End: 2019-03-28

## 2019-03-28 RX ORDER — CHROMIUM 200 MCG
TABLET ORAL
Refills: 0 | Status: DISCONTINUED | COMMUNITY
End: 2019-03-28

## 2019-03-28 RX ORDER — FLUDROCORTISONE ACETATE 0.1 MG/1
0.1 TABLET ORAL
Qty: 30 | Refills: 0 | Status: DISCONTINUED | COMMUNITY
Start: 2018-06-18 | End: 2019-03-28

## 2019-03-28 RX ORDER — HYDROCHLOROTHIAZIDE 25 MG/1
25 TABLET ORAL DAILY
Refills: 0 | Status: DISCONTINUED | COMMUNITY
End: 2019-03-28

## 2019-03-28 RX ORDER — METOPROLOL TARTRATE 25 MG/1
25 TABLET, FILM COATED ORAL
Qty: 90 | Refills: 0 | Status: DISCONTINUED | COMMUNITY
Start: 2018-05-20 | End: 2019-03-28

## 2019-03-28 NOTE — PHYSICAL EXAM
[General Appearance - Alert] : alert [General Appearance - In No Acute Distress] : in no acute distress [General Appearance - Well Nourished] : well nourished [General Appearance - Well Developed] : well developed [Sclera] : the sclera and conjunctiva were normal [PERRL With Normal Accommodation] : pupils were equal in size, round, and reactive to light [Extraocular Movements] : extraocular movements were intact [Completely Obscured] : completely [] : was obscured [Partially Obscured] : partially [Cerumen in Canal] : by cerumen

## 2019-03-28 NOTE — ASSESSMENT
[FreeTextEntry1] : continue systane eye drops for now; this may be an allergy. F/U with Dr. Pandya or her own opthalmologist

## 2019-03-28 NOTE — HISTORY OF PRESENT ILLNESS
[FreeTextEntry1] : Has been using artificial tears with no effect\par both eyes are "bothering her", no pain\par vision is a little blurry which comes and goes\par has stopped using bacitracin eye ointment which \par has resolved some of the blurriness\par

## 2019-03-28 NOTE — REVIEW OF SYSTEMS
[Eyesight Problems] : eyesight problems [Fever] : no fever [Chills] : no chills [Feeling Poorly] : not feeling poorly [As Noted in HPI] : as noted in HPI [Eyes Itch] : itching of the eyes [Anxiety] : anxiety [Negative] : Constitutional [FreeTextEntry2] : no headache [de-identified] : feeling a little overwhelmed with MD appts - is starting radiation therapy to her head for skin cancer treatment

## 2019-04-02 ENCOUNTER — APPOINTMENT (OUTPATIENT)
Dept: GERIATRICS | Facility: CLINIC | Age: 84
End: 2019-04-02
Payer: MEDICARE

## 2019-04-02 PROCEDURE — 69210 REMOVE IMPACTED EAR WAX UNI: CPT

## 2019-04-03 VITALS — RESPIRATION RATE: 20 BRPM | SYSTOLIC BLOOD PRESSURE: 146 MMHG | DIASTOLIC BLOOD PRESSURE: 70 MMHG | HEART RATE: 70 BPM

## 2019-04-03 NOTE — PHYSICAL EXAM
[General Appearance - Alert] : alert [General Appearance - In No Acute Distress] : in no acute distress [Cerumen in Canal] : by cerumen [] : was obscured [Completely Obscured] : completely

## 2019-04-03 NOTE — ASSESSMENT
[FreeTextEntry1] : Removed large clump of cerumen from left ear using lighted curette, another large piece visible and hard in same ear. Still not able to visualize TM. Removed moderat amount of cerumen from right ear and additional cerumen and debris in ear and unable to be removed due to hardness. Continue debrox bid x 5 more days and return for f/u cerumen removal

## 2019-04-05 ENCOUNTER — RX RENEWAL (OUTPATIENT)
Age: 84
End: 2019-04-05

## 2019-04-05 NOTE — HISTORY OF PRESENT ILLNESS
[Therapy: ___] : Therapy: [unfilled] [1 - Distress Level] : Distress Level: 1 [de-identified] : Patient presents today for PNET, anemia.  She is currently living at [facility]. She had biopsy of her scalp with Dr.Derick Clifton. She had a squamous cell lesion on the top of her head.  She may require radiation to the scalp. She also had right lower extremity doppler for leg swelling,redness which was negative.  Otherwise she denies complaint. She denies rash, fever, infections, bleeding, bruising, nausea,vomiting,cough, SOB, chest pain, change in BM, headache or dizziness.  [FreeTextEntry1] : New patient, multiple medical problems\par  [de-identified] : Patient is an 89 year old new consult for newly diagnosed PNET Pt.had a h/o abd pain and underwent an abdominal u/s on 04/25/2017.Impression showed patient was post cholecystectomy. There is dilation of the extrahepatic duct. Mild intrahepatic ductal dilation is also appreciated. Further evaluation recommended. Pt. then underwent an MRI of abd w/ w/o contrast that showed moderate intra and extrahepatic biliary ductal dilation and choledocholithiasis at least 3 common bile duct stones as described. A 2.3 x 2.3 uncinate process pancreatic mass with imaging features favoring a pancreatic neuroendocrine tumor rather than adenocarcinoma. Patient underwent a fine needle aspiration for cytology of the pancreatic uncinate process mass that showed well differentiated pancreatic neuroendocrine tumor. Immunohistochemical stains for chromogranin, synaptophysin and Ki-67 wer eperformed on the cell block. Chromogranin and synaphysin are positive and Ki-67 shows less that 3% staining. \par \par

## 2019-04-05 NOTE — PHYSICAL EXAM
[Restricted in physically strenuous activity but ambulatory and able to carry out work of a light or sedentary nature] : Status 1- Restricted in physically strenuous activity but ambulatory and able to carry out work of a light or sedentary nature, e.g., light house work, office work [Normal] : affect appropriate [No Acute Distress] : no acute distress [Normal Sclera/Conjunctiva] : normal sclera/conjunctiva [Normal Outer Ear/Nose] : the outer ears and nose were normal in appearance [No Respiratory Distress] : no respiratory distress  [Clear to Auscultation] : lungs were clear to auscultation bilaterally [Normal Rate] : normal rate  [Memory Grossly Normal] : memory grossly normal [de-identified] : Crusted, large scalp lesions. [de-identified] : Frail appearing [de-identified] : decreased ROM [de-identified] : decreased pulmonary sounds [de-identified] : defered [de-identified] : non palpable

## 2019-04-05 NOTE — REVIEW OF SYSTEMS
[Hearing Loss] : hearing loss [Dizziness] : dizziness [Unsteady Walking] : ataxia [Negative] : Heme/Lymph [Chills] : no chills [Dry Eyes] : no dryness of the eyes [Vision Problems] : no vision problems [Hoarseness] : no hoarseness [Mucosal Pain] : no mucosal pain [Lower Ext Edema] : no lower extremity edema [Diarrhea] : no diarrhea [Muscle Pain] : no muscle pain [Skin Wound] : no skin wound [Confused] : no confusion [Difficulty Walking] : no difficulty walking [Insomnia] : no insomnia [Hot Flashes] : no hot flashes [FreeTextEntry3] : glasses [FreeTextEntry4] : MAGUI [FreeTextEntry8] : nocturia [FreeTextEntry9] : s/p hip repair with marcella placement [de-identified] : SCC  crusted lesions of the scalp [Fever] : no fever [Fatigue] : no fatigue [Night Sweats] : no night sweats [Recent Change In Weight] : ~T no recent weight change [Earache] : no earache [Nosebleed] : no nosebleeds [Nasal Discharge] : no nasal discharge [Postnasal Drip] : no postnasal drip [Chest Pain] : no chest pain [Palpitations] : no palpitations [Paroysmal Nocturnal Dyspnea] : no paroysmal nocturnal dyspnea [Shortness Of Breath] : no shortness of breath [Cough] : no cough [Constipation] : no constipation [Diarrhea] : diarrhea [Dysuria] : no dysuria [Joint Pain] : no joint pain [Joint Swelling] : no joint swelling [Itching] : no itching [Skin Rash] : no skin rash [Headache] : no headache [Memory Loss] : no memory loss [Anxiety] : no anxiety [Depression] : no depression [Easy Bleeding] : no easy bleeding [Easy Bruising] : no easy bruising [FreeTextEntry2] : Frail looking elderly patient

## 2019-04-05 NOTE — PLAN
[FreeTextEntry1] : The patient is alert, aware of her medical problems\par pending oncology evaluation with dr Landry at [place]\par The patient educated about fall prevention\par

## 2019-04-05 NOTE — CONSULT LETTER
[Dear  ___] : Dear  [unfilled], [Consult Letter:] : I had the pleasure of evaluating your patient, [unfilled]. [Please see my note below.] : Please see my note below. [Consult Closing:] : Thank you very much for allowing me to participate in the care of this patient.  If you have any questions, please do not hesitate to contact me. [Sincerely,] : Sincerely, [DrRoel  ___] : Dr. HERNÁNDEZ [FreeTextEntry3] : Mandie Jackson MD\par [facility] [place] [facility]\par

## 2019-04-05 NOTE — CONSULT LETTER
[Dear  ___] : Dear  [unfilled], [Consult Letter:] : I had the pleasure of evaluating your patient, [unfilled]. [Please see my note below.] : Please see my note below. [Consult Closing:] : Thank you very much for allowing me to participate in the care of this patient.  If you have any questions, please do not hesitate to contact me. [Sincerely,] : Sincerely, [DrRoel  ___] : Dr. HERNÁNDEZ [FreeTextEntry3] : Mandie Jackson MD\par [facility] [place] [facility]\par

## 2019-04-05 NOTE — HISTORY OF PRESENT ILLNESS
[Therapy: ___] : Therapy: [unfilled] [1 - Distress Level] : Distress Level: 1 [de-identified] : Patient presents today for PNET, anemia.  She is currently living at [facility]. She had biopsy of her scalp with Dr.Derick Clifton. She had a squamous cell lesion on the top of her head.  She may require radiation to the scalp. She also had right lower extremity doppler for leg swelling,redness which was negative.  Otherwise she denies complaint. She denies rash, fever, infections, bleeding, bruising, nausea,vomiting,cough, SOB, chest pain, change in BM, headache or dizziness.  [FreeTextEntry1] : New patient, multiple medical problems\par  [de-identified] : Patient is an 89 year old new consult for newly diagnosed PNET Pt.had a h/o abd pain and underwent an abdominal u/s on 04/25/2017.Impression showed patient was post cholecystectomy. There is dilation of the extrahepatic duct. Mild intrahepatic ductal dilation is also appreciated. Further evaluation recommended. Pt. then underwent an MRI of abd w/ w/o contrast that showed moderate intra and extrahepatic biliary ductal dilation and choledocholithiasis at least 3 common bile duct stones as described. A 2.3 x 2.3 uncinate process pancreatic mass with imaging features favoring a pancreatic neuroendocrine tumor rather than adenocarcinoma. Patient underwent a fine needle aspiration for cytology of the pancreatic uncinate process mass that showed well differentiated pancreatic neuroendocrine tumor. Immunohistochemical stains for chromogranin, synaptophysin and Ki-67 wer eperformed on the cell block. Chromogranin and synaphysin are positive and Ki-67 shows less that 3% staining. \par \par

## 2019-04-05 NOTE — HEALTH RISK ASSESSMENT
[Fair] : ~his/her~ current health as fair  [Good] : ~his/her~  mood as  good [Intercurrent ED visits] : went to ED [Intercurrent hospitalizations] : was admitted to the hospital  [1] : 2) Feeling down, depressed, or hopeless for several days (1) [FreeTextEntry1] : her health [] : No [de-identified] : pending oncology [de-identified] : increased risk

## 2019-04-05 NOTE — PLAN
[FreeTextEntry1] : The patient is alert, aware of her medical problems\par pending oncology evaluation with dr Landry at [place]\par The patient educated about fall prevention\par

## 2019-04-05 NOTE — HEALTH RISK ASSESSMENT
[Fair] : ~his/her~ current health as fair  [Good] : ~his/her~  mood as  good [Intercurrent ED visits] : went to ED [Intercurrent hospitalizations] : was admitted to the hospital  [1] : 2) Feeling down, depressed, or hopeless for several days (1) [FreeTextEntry1] : her health [] : No [de-identified] : pending oncology [de-identified] : increased risk

## 2019-04-05 NOTE — PHYSICAL EXAM
[Restricted in physically strenuous activity but ambulatory and able to carry out work of a light or sedentary nature] : Status 1- Restricted in physically strenuous activity but ambulatory and able to carry out work of a light or sedentary nature, e.g., light house work, office work [Normal] : affect appropriate [No Acute Distress] : no acute distress [Normal Sclera/Conjunctiva] : normal sclera/conjunctiva [Normal Outer Ear/Nose] : the outer ears and nose were normal in appearance [No Respiratory Distress] : no respiratory distress  [Clear to Auscultation] : lungs were clear to auscultation bilaterally [Normal Rate] : normal rate  [Memory Grossly Normal] : memory grossly normal [de-identified] : Crusted, large scalp lesions. [de-identified] : Frail appearing [de-identified] : decreased ROM [de-identified] : decreased pulmonary sounds [de-identified] : defered [de-identified] : non palpable

## 2019-04-05 NOTE — REVIEW OF SYSTEMS
[Hearing Loss] : hearing loss [Dizziness] : dizziness [Unsteady Walking] : ataxia [Negative] : Heme/Lymph [Chills] : no chills [Dry Eyes] : no dryness of the eyes [Vision Problems] : no vision problems [Hoarseness] : no hoarseness [Mucosal Pain] : no mucosal pain [Lower Ext Edema] : no lower extremity edema [Diarrhea] : no diarrhea [Muscle Pain] : no muscle pain [Skin Wound] : no skin wound [Confused] : no confusion [Difficulty Walking] : no difficulty walking [Insomnia] : no insomnia [Hot Flashes] : no hot flashes [FreeTextEntry3] : glasses [FreeTextEntry4] : MAGUI [FreeTextEntry8] : nocturia [FreeTextEntry9] : s/p hip repair with marcella placement [de-identified] : SCC  crusted lesions of the scalp [Fever] : no fever [Fatigue] : no fatigue [Night Sweats] : no night sweats [Recent Change In Weight] : ~T no recent weight change [Earache] : no earache [Nosebleed] : no nosebleeds [Nasal Discharge] : no nasal discharge [Postnasal Drip] : no postnasal drip [Chest Pain] : no chest pain [Palpitations] : no palpitations [Paroysmal Nocturnal Dyspnea] : no paroysmal nocturnal dyspnea [Shortness Of Breath] : no shortness of breath [Cough] : no cough [Constipation] : no constipation [Diarrhea] : diarrhea [Dysuria] : no dysuria [Joint Pain] : no joint pain [Joint Swelling] : no joint swelling [Itching] : no itching [Skin Rash] : no skin rash [Headache] : no headache [Memory Loss] : no memory loss [Anxiety] : no anxiety [Depression] : no depression [Easy Bleeding] : no easy bleeding [Easy Bruising] : no easy bruising [FreeTextEntry2] : Frail looking elderly patient

## 2019-04-08 ENCOUNTER — APPOINTMENT (OUTPATIENT)
Dept: GERIATRICS | Facility: CLINIC | Age: 84
End: 2019-04-08

## 2019-04-08 ENCOUNTER — RX RENEWAL (OUTPATIENT)
Age: 84
End: 2019-04-08

## 2019-04-09 ENCOUNTER — RX RENEWAL (OUTPATIENT)
Age: 84
End: 2019-04-09

## 2019-04-10 ENCOUNTER — RX RENEWAL (OUTPATIENT)
Age: 84
End: 2019-04-10

## 2019-04-19 ENCOUNTER — RX RENEWAL (OUTPATIENT)
Age: 84
End: 2019-04-19

## 2019-04-22 ENCOUNTER — RX RENEWAL (OUTPATIENT)
Age: 84
End: 2019-04-22

## 2019-04-24 ENCOUNTER — RESULT REVIEW (OUTPATIENT)
Age: 84
End: 2019-04-24

## 2019-04-24 ENCOUNTER — APPOINTMENT (OUTPATIENT)
Dept: HEMATOLOGY ONCOLOGY | Facility: CLINIC | Age: 84
End: 2019-04-24
Payer: MEDICARE

## 2019-04-24 ENCOUNTER — RX RENEWAL (OUTPATIENT)
Age: 84
End: 2019-04-24

## 2019-04-24 VITALS
SYSTOLIC BLOOD PRESSURE: 178 MMHG | RESPIRATION RATE: 16 BRPM | DIASTOLIC BLOOD PRESSURE: 67 MMHG | HEART RATE: 62 BPM | WEIGHT: 137 LBS | BODY MASS INDEX: 24.89 KG/M2 | TEMPERATURE: 98.7 F | HEIGHT: 62.01 IN | OXYGEN SATURATION: 99 %

## 2019-04-24 DIAGNOSIS — R79.89 OTHER SPECIFIED ABNORMAL FINDINGS OF BLOOD CHEMISTRY: ICD-10-CM

## 2019-04-24 PROCEDURE — 99214 OFFICE O/P EST MOD 30 MIN: CPT

## 2019-04-24 NOTE — ASSESSMENT
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 \par Ferritn 300s  ( last IV iron Jan 2018) \par \par Vitamin B12 632 continue oral B12. \par \par Constipation - resolved \par \par Osteoporosis- last Reclast 6/18, due 6/19.\par \par \par Patient in assisted living after recent hip fx/ ORIF\par \par SCC of the scalp\par - continue therapy per .\par \par Monitor CBC, Chem Profile, Chromagranin\par \par History of present illness, review of systems, physical exam and treatment plan reviewed with . \par \par Office visit in 4  weeks for Somatuline. or prn for new or worsening symptoms. \par \par CBC, Chem Profile, Chromgranin at next visit.\par \par \par

## 2019-04-24 NOTE — PHYSICAL EXAM
[Restricted in physically strenuous activity but ambulatory and able to carry out work of a light or sedentary nature] : Status 1- Restricted in physically strenuous activity but ambulatory and able to carry out work of a light or sedentary nature, e.g., light house work, office work [Normal] : grossly intact [No Acute Distress] : no acute distress [No Respiratory Distress] : no respiratory distress  [Normal Sclera/Conjunctiva] : normal sclera/conjunctiva [Normal Outer Ear/Nose] : the outer ears and nose were normal in appearance [Memory Grossly Normal] : memory grossly normal [Clear to Auscultation] : lungs were clear to auscultation bilaterally [Normal Rate] : normal rate  [de-identified] : Frail appearing [de-identified] : Crusted, large scalp lesions., almost healed. [de-identified] : decreased ROM [de-identified] : decreased pulmonary sounds [de-identified] : defered [de-identified] : non palpable

## 2019-04-24 NOTE — REVIEW OF SYSTEMS
[Hearing Loss] : hearing loss [Dizziness] : dizziness [Unsteady Walking] : ataxia [Negative] : Heme/Lymph [Chills] : no chills [Dry Eyes] : no dryness of the eyes [Hoarseness] : no hoarseness [Vision Problems] : no vision problems [Mucosal Pain] : no mucosal pain [Lower Ext Edema] : no lower extremity edema [Diarrhea] : no diarrhea [Skin Wound] : no skin wound [Muscle Pain] : no muscle pain [Difficulty Walking] : no difficulty walking [Confused] : no confusion [Insomnia] : no insomnia [Hot Flashes] : no hot flashes [FreeTextEntry3] : glasses [FreeTextEntry4] : MAGUI [de-identified] : SCC  crusted lesions of the scalp healing. [FreeTextEntry8] : nocturia [FreeTextEntry9] : s/p hip repair with marcella placement [Fever] : no fever [Night Sweats] : no night sweats [Fatigue] : no fatigue [Recent Change In Weight] : ~T no recent weight change [Earache] : no earache [Nasal Discharge] : no nasal discharge [Nosebleed] : no nosebleeds [Postnasal Drip] : no postnasal drip [Chest Pain] : no chest pain [Palpitations] : no palpitations [Paroysmal Nocturnal Dyspnea] : no paroysmal nocturnal dyspnea [Shortness Of Breath] : no shortness of breath [Constipation] : no constipation [Cough] : no cough [Diarrhea] : diarrhea [Joint Pain] : no joint pain [Dysuria] : no dysuria [Joint Swelling] : no joint swelling [Itching] : no itching [Skin Rash] : no skin rash [Headache] : no headache [Anxiety] : no anxiety [Memory Loss] : no memory loss [Easy Bleeding] : no easy bleeding [Depression] : no depression [Easy Bruising] : no easy bruising [FreeTextEntry2] : Frail looking elderly patient

## 2019-04-24 NOTE — HEALTH RISK ASSESSMENT
[Fair] : ~his/her~ current health as fair  [Good] : ~his/her~  mood as  good [Intercurrent hospitalizations] : was admitted to the hospital  [Intercurrent ED visits] : went to ED [1] : 2) Feeling down, depressed, or hopeless for several days (1) [FreeTextEntry1] : her health [] : No [de-identified] : pending oncology [de-identified] : increased risk

## 2019-04-24 NOTE — PLAN
[FreeTextEntry1] : The patient is alert, aware of her medical problems\par pending oncology evaluation with dr Landry at [place]\par The patient educated about fall prevention\par

## 2019-04-24 NOTE — HISTORY OF PRESENT ILLNESS
[Therapy: ___] : Therapy: [unfilled] [1 - Distress Level] : Distress Level: 1 [FreeTextEntry1] : New patient, multiple medical problems\par  [de-identified] : Patient presents today for PNET, anemia. She  continues to receive Somatuline 60 mg.  She went for second opinion with dermatology Dr. CARRERO is a regarding squamous cell lesion on her scalp. He recommended deferring radiation therapy and applying oil to her scalp and showering afterwards. The area seems to be clearing up.  Her BM are regular but she is concerned that the MD at her assisted living cut her Lactulose dose in half. She gained 5 lbs.  She denies flushing,  rash, fever, infections, bleeding, bruising, nausea,vomiting,cough, SOB, chest pain, change in BM, headache or dizziness.  [de-identified] : Patient is an 89 year old new consult for newly diagnosed PNET Pt.had a h/o abd pain and underwent an abdominal u/s on 04/25/2017.Impression showed patient was post cholecystectomy. There is dilation of the extrahepatic duct. Mild intrahepatic ductal dilation is also appreciated. Further evaluation recommended. Pt. then underwent an MRI of abd w/ w/o contrast that showed moderate intra and extrahepatic biliary ductal dilation and choledocholithiasis at least 3 common bile duct stones as described. A 2.3 x 2.3 uncinate process pancreatic mass with imaging features favoring a pancreatic neuroendocrine tumor rather than adenocarcinoma. Patient underwent a fine needle aspiration for cytology of the pancreatic uncinate process mass that showed well differentiated pancreatic neuroendocrine tumor. Immunohistochemical stains for chromogranin, synaptophysin and Ki-67 wer eperformed on the cell block. Chromogranin and synaphysin are positive and Ki-67 shows less that 3% staining. \par \par

## 2019-05-22 ENCOUNTER — RESULT REVIEW (OUTPATIENT)
Age: 84
End: 2019-05-22

## 2019-05-22 ENCOUNTER — APPOINTMENT (OUTPATIENT)
Dept: HEMATOLOGY ONCOLOGY | Facility: CLINIC | Age: 84
End: 2019-05-22
Payer: MEDICARE

## 2019-05-22 VITALS
HEART RATE: 56 BPM | HEIGHT: 62.01 IN | TEMPERATURE: 98.1 F | BODY MASS INDEX: 25.08 KG/M2 | SYSTOLIC BLOOD PRESSURE: 161 MMHG | RESPIRATION RATE: 16 BRPM | WEIGHT: 138 LBS | OXYGEN SATURATION: 97 % | DIASTOLIC BLOOD PRESSURE: 65 MMHG

## 2019-05-22 PROCEDURE — 99213 OFFICE O/P EST LOW 20 MIN: CPT

## 2019-06-07 NOTE — PLAN
[FreeTextEntry1] : The patient is alert, aware of her medical problems\par pending oncology evaluation with dr Landry at [place]\par The patient educated about fall prevention\par

## 2019-06-07 NOTE — ASSESSMENT
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 \par Ferritn 300s  ( last IV iron Jan 2018) \par \par Vitamin B12 632 continue oral B12. \par \par Constipation - resolved \par \par Osteoporosis-  Reclast at next visit, due 6/19.\par \par \par Patient in assisted living after recent hip fx/ ORIF\par \par SCC of the scalp\par - continue therapy per .\par \par Monitor CBC, Chem Profile, Chromagranin\par \par History of present illness, review of systems, physical exam and treatment plan reviewed with . \par \par Office visit in 4  weeks for Somatuline and Reclast or prn for new or worsening symptoms. \par \par CBC, Chem Profile, Chromgranin at next visit.\par \par \par

## 2019-06-07 NOTE — PHYSICAL EXAM
[Restricted in physically strenuous activity but ambulatory and able to carry out work of a light or sedentary nature] : Status 1- Restricted in physically strenuous activity but ambulatory and able to carry out work of a light or sedentary nature, e.g., light house work, office work [Normal] : affect appropriate [No Acute Distress] : no acute distress [Normal Sclera/Conjunctiva] : normal sclera/conjunctiva [Normal Outer Ear/Nose] : the outer ears and nose were normal in appearance [No Respiratory Distress] : no respiratory distress  [Clear to Auscultation] : lungs were clear to auscultation bilaterally [Normal Rate] : normal rate  [Memory Grossly Normal] : memory grossly normal [de-identified] : decreased ROM [de-identified] : Crusted, large scalp lesions., resolved. [de-identified] : Frail appearing [de-identified] : defered [de-identified] : decreased pulmonary sounds [de-identified] : non palpable

## 2019-06-07 NOTE — REVIEW OF SYSTEMS
[Hearing Loss] : hearing loss [Dizziness] : dizziness [Unsteady Walking] : ataxia [Negative] : Heme/Lymph [Diarrhea] : diarrhea [Chills] : no chills [Dry Eyes] : no dryness of the eyes [Vision Problems] : no vision problems [Hoarseness] : no hoarseness [Mucosal Pain] : no mucosal pain [Lower Ext Edema] : no lower extremity edema [Muscle Pain] : no muscle pain [Difficulty Walking] : no difficulty walking [Confused] : no confusion [Skin Wound] : no skin wound [Insomnia] : no insomnia [Hot Flashes] : no hot flashes [FreeTextEntry3] : glasses [FreeTextEntry4] : MAGUI [FreeTextEntry9] : s/p hip repair with marcella placement [FreeTextEntry8] : nocturia [FreeTextEntry7] : Occas. 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 [Fever] : no fever [Night Sweats] : no night sweats [Fatigue] : no fatigue [Earache] : no earache [Recent Change In Weight] : ~T no recent weight change [Nosebleed] : no nosebleeds [Chest Pain] : no chest pain [Postnasal Drip] : no postnasal drip [Nasal Discharge] : no nasal discharge [Paroysmal Nocturnal Dyspnea] : no paroysmal nocturnal dyspnea [Palpitations] : no palpitations [Shortness Of Breath] : no shortness of breath [Cough] : no cough [Constipation] : no constipation [Dysuria] : no dysuria [Diarrhea] : diarrhea [Joint Pain] : no joint pain [Skin Rash] : no skin rash [Joint Swelling] : no joint swelling [Itching] : no itching [Headache] : no headache [Memory Loss] : no memory loss [Anxiety] : no anxiety [Depression] : no depression [Easy Bleeding] : no easy bleeding [FreeTextEntry2] : Frail looking elderly patient [Easy Bruising] : no easy bruising

## 2019-06-07 NOTE — HEALTH RISK ASSESSMENT
[Fair] : ~his/her~ current health as fair  [Good] : ~his/her~  mood as  good [Intercurrent ED visits] : went to ED [Intercurrent hospitalizations] : was admitted to the hospital  [1] : 2) Feeling down, depressed, or hopeless for several days (1) [FreeTextEntry1] : her health [de-identified] : pending oncology [] : No [de-identified] : increased risk

## 2019-06-07 NOTE — RESULTS/DATA
[FreeTextEntry1] : may 22, 2019\par WBC 6.0\par HGB 11.5\par HCT 35.6\par Platelets 263,000\par \par Chromogranin A 826 (prior 744)  \par \par Ferritin 330\par Vit B12 632

## 2019-06-07 NOTE — HISTORY OF PRESENT ILLNESS
[Therapy: ___] : Therapy: [unfilled] [1 - Distress Level] : Distress Level: 1 [de-identified] : Patient presents today for PNET, anemia. She  continues to receive Somatuline 60 mg.  She is currently putting A & D Ointment on her head. She had diarrhea twice yesterday but generally has normal BMs. She eats well.   She denies flushing,  rash, fever, infections, bleeding, bruising, nausea,vomiting,cough, SOB, chest pain, change in BM, headache or dizziness.  [FreeTextEntry1] : New patient, multiple medical problems\par  [de-identified] : Patient is an 89 year old new consult for newly diagnosed PNET Pt.had a h/o abd pain and underwent an abdominal u/s on 04/25/2017.Impression showed patient was post cholecystectomy. There is dilation of the extrahepatic duct. Mild intrahepatic ductal dilation is also appreciated. Further evaluation recommended. Pt. then underwent an MRI of abd w/ w/o contrast that showed moderate intra and extrahepatic biliary ductal dilation and choledocholithiasis at least 3 common bile duct stones as described. A 2.3 x 2.3 uncinate process pancreatic mass with imaging features favoring a pancreatic neuroendocrine tumor rather than adenocarcinoma. Patient underwent a fine needle aspiration for cytology of the pancreatic uncinate process mass that showed well differentiated pancreatic neuroendocrine tumor. Immunohistochemical stains for chromogranin, synaptophysin and Ki-67 wer eperformed on the cell block. Chromogranin and synaphysin are positive and Ki-67 shows less that 3% staining. \par \par

## 2019-06-19 ENCOUNTER — RESULT REVIEW (OUTPATIENT)
Age: 84
End: 2019-06-19

## 2019-06-19 ENCOUNTER — APPOINTMENT (OUTPATIENT)
Dept: HEMATOLOGY ONCOLOGY | Facility: CLINIC | Age: 84
End: 2019-06-19
Payer: MEDICARE

## 2019-06-19 VITALS
DIASTOLIC BLOOD PRESSURE: 68 MMHG | HEIGHT: 62.01 IN | OXYGEN SATURATION: 97 % | WEIGHT: 135 LBS | HEART RATE: 65 BPM | TEMPERATURE: 98.4 F | RESPIRATION RATE: 18 BRPM | SYSTOLIC BLOOD PRESSURE: 132 MMHG | BODY MASS INDEX: 24.53 KG/M2

## 2019-06-19 PROCEDURE — 99214 OFFICE O/P EST MOD 30 MIN: CPT

## 2019-06-19 NOTE — ASSESSMENT
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 \par Ferritn 300s  ( last IV iron Jan 2018) \par \par Vitamin B12 632 continue oral B12. \par \par Osteoporosis-  Reclast at next visit, due 6/19.\par \par \par Patient in assisted living after recent hip fx/ ORIF\par \par SCC of the scalp\par - continue therapy per .\par \par Monitor CBC, Chem Profile, Chromagranin\par \par History of present illness, review of systems, physical exam and treatment plan reviewed with . \par \par Office visit in 4  weeks for Somatuline and Reclast or prn for new or worsening symptoms. \par \par CBC, Chem Profile, Chromgranin at next visit.\par \par \par

## 2019-06-19 NOTE — RESULTS/DATA
[FreeTextEntry1] : 6/19/19\par WBC 5.9\par HGB 11.9\par HCT 36.4\par ,000\par \par 5/19 Chromogranin A 826 (prior 744)  \par \par Ferritin 330\par Vit B12 632

## 2019-06-19 NOTE — REVIEW OF SYSTEMS
[Diarrhea] : diarrhea [Hearing Loss] : hearing loss [Dizziness] : dizziness [Unsteady Walking] : ataxia [Negative] : Heme/Lymph [Chills] : no chills [Dry Eyes] : no dryness of the eyes [Vision Problems] : no vision problems [Hoarseness] : no hoarseness [Mucosal Pain] : no mucosal pain [Lower Ext Edema] : no lower extremity edema [Muscle Pain] : no muscle pain [Difficulty Walking] : no difficulty walking [Confused] : no confusion [Skin Wound] : no skin wound [Insomnia] : no insomnia [Hot Flashes] : no hot flashes [FreeTextEntry3] : glasses [FreeTextEntry4] : MAGUI [FreeTextEntry8] : nocturia [FreeTextEntry7] : Occas. 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 [FreeTextEntry9] : s/p hip repair with marcella placement [Fatigue] : no fatigue [Fever] : no fever [Night Sweats] : no night sweats [Recent Change In Weight] : ~T no recent weight change [Earache] : no earache [Nasal Discharge] : no nasal discharge [Nosebleed] : no nosebleeds [Chest Pain] : no chest pain [Postnasal Drip] : no postnasal drip [Palpitations] : no palpitations [Paroysmal Nocturnal Dyspnea] : no paroysmal nocturnal dyspnea [Shortness Of Breath] : no shortness of breath [Cough] : no cough [Constipation] : no constipation [Dysuria] : no dysuria [Diarrhea] : diarrhea [Joint Pain] : no joint pain [Joint Swelling] : no joint swelling [Itching] : no itching [Headache] : no headache [Memory Loss] : no memory loss [Skin Rash] : no skin rash [Depression] : no depression [Anxiety] : no anxiety [Easy Bleeding] : no easy bleeding [FreeTextEntry2] : Frail looking elderly patient [Easy Bruising] : no easy bruising

## 2019-06-19 NOTE — HEALTH RISK ASSESSMENT
[Fair] : ~his/her~ current health as fair  [Good] : ~his/her~  mood as  good [Intercurrent hospitalizations] : was admitted to the hospital  [Intercurrent ED visits] : went to ED [1] : 2) Feeling down, depressed, or hopeless for several days (1) [FreeTextEntry1] : her health [de-identified] : increased risk [] : No [de-identified] : pending oncology

## 2019-06-19 NOTE — PHYSICAL EXAM
[Restricted in physically strenuous activity but ambulatory and able to carry out work of a light or sedentary nature] : Status 1- Restricted in physically strenuous activity but ambulatory and able to carry out work of a light or sedentary nature, e.g., light house work, office work [Normal] : affect appropriate [No Acute Distress] : no acute distress [Normal Sclera/Conjunctiva] : normal sclera/conjunctiva [Normal Outer Ear/Nose] : the outer ears and nose were normal in appearance [No Respiratory Distress] : no respiratory distress  [Clear to Auscultation] : lungs were clear to auscultation bilaterally [Normal Rate] : normal rate  [Memory Grossly Normal] : memory grossly normal [de-identified] : Crusted, large scalp lesions., resolved. [de-identified] : decreased pulmonary sounds [de-identified] : Frail appearing [de-identified] : decreased ROM [de-identified] : defered [de-identified] : non palpable

## 2019-06-19 NOTE — HISTORY OF PRESENT ILLNESS
[Therapy: ___] : Therapy: [unfilled] [1 - Distress Level] : Distress Level: 1 [de-identified] : Patient presents today for PNET, anemia. She  continues to receive Somatuline 60 mg.  She is getting unexpected bouts of diarrhea for the last 7-10 days.. She continues to put A & D Ointment on her head and will see  in the fall. She is extremely stressed about a family situation and worry over financial issues.   She denies flushing,  rash, fever, infections, bleeding, bruising, nausea,vomiting,cough, SOB, chest pain, headache or dizziness.  [FreeTextEntry1] : New patient, multiple medical problems\par  [de-identified] : Patient is an 89 year old new consult for newly diagnosed PNET Pt.had a h/o abd pain and underwent an abdominal u/s on 04/25/2017.Impression showed patient was post cholecystectomy. There is dilation of the extrahepatic duct. Mild intrahepatic ductal dilation is also appreciated. Further evaluation recommended. Pt. then underwent an MRI of abd w/ w/o contrast that showed moderate intra and extrahepatic biliary ductal dilation and choledocholithiasis at least 3 common bile duct stones as described. A 2.3 x 2.3 uncinate process pancreatic mass with imaging features favoring a pancreatic neuroendocrine tumor rather than adenocarcinoma. Patient underwent a fine needle aspiration for cytology of the pancreatic uncinate process mass that showed well differentiated pancreatic neuroendocrine tumor. Immunohistochemical stains for chromogranin, synaptophysin and Ki-67 wer eperformed on the cell block. Chromogranin and synaphysin are positive and Ki-67 shows less that 3% staining. \par \par

## 2019-06-19 NOTE — PLAN
[FreeTextEntry1] : The patient is alert, aware of her medical problems\par pending oncology evaluation with dr Landry at [place]\par The patient educated about fall prevention\par

## 2019-06-19 NOTE — REASON FOR VISIT
[Family Member] : family member [Follow-Up Visit] : a follow-up [Other: _____] : [unfilled] [FreeTextEntry2] : PNET, Anemia, constipation

## 2019-07-14 ENCOUNTER — RX RENEWAL (OUTPATIENT)
Age: 84
End: 2019-07-14

## 2019-07-15 ENCOUNTER — RX RENEWAL (OUTPATIENT)
Age: 84
End: 2019-07-15

## 2019-07-17 ENCOUNTER — APPOINTMENT (OUTPATIENT)
Dept: HEMATOLOGY ONCOLOGY | Facility: CLINIC | Age: 84
End: 2019-07-17
Payer: MEDICARE

## 2019-07-17 ENCOUNTER — RESULT REVIEW (OUTPATIENT)
Age: 84
End: 2019-07-17

## 2019-07-17 VITALS
HEIGHT: 62.01 IN | HEART RATE: 72 BPM | SYSTOLIC BLOOD PRESSURE: 146 MMHG | TEMPERATURE: 98.3 F | BODY MASS INDEX: 25.08 KG/M2 | OXYGEN SATURATION: 97 % | RESPIRATION RATE: 18 BRPM | WEIGHT: 138 LBS | DIASTOLIC BLOOD PRESSURE: 63 MMHG

## 2019-07-17 PROCEDURE — 99213 OFFICE O/P EST LOW 20 MIN: CPT

## 2019-07-22 ENCOUNTER — RX RENEWAL (OUTPATIENT)
Age: 84
End: 2019-07-22

## 2019-07-26 NOTE — PHYSICAL EXAM
[de-identified] : Crusted, large scalp lesions., resolved. [de-identified] : Frail appearing [de-identified] : decreased ROM [de-identified] : decreased pulmonary sounds [de-identified] : defered [de-identified] : non palpable

## 2019-07-26 NOTE — REVIEW OF SYSTEMS
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 [Fever] : no fever [Fatigue] : no fatigue [Night Sweats] : no night sweats [Recent Change In Weight] : ~T no recent weight change [Earache] : no earache [Nosebleed] : no nosebleeds [Postnasal Drip] : no postnasal drip [Nasal Discharge] : no nasal discharge [Chest Pain] : no chest pain [Palpitations] : no palpitations [Paroysmal Nocturnal Dyspnea] : no paroysmal nocturnal dyspnea [Shortness Of Breath] : no shortness of breath [Cough] : no cough [Constipation] : no constipation [Diarrhea] : diarrhea [Dysuria] : no dysuria [Joint Pain] : no joint pain [Joint Swelling] : no joint swelling [Itching] : no itching [Skin Rash] : no skin rash [Headache] : no headache [Memory Loss] : no memory loss [Anxiety] : no anxiety [Depression] : no depression [Easy Bleeding] : no easy bleeding [Easy Bruising] : no easy bruising [FreeTextEntry2] : Frail looking elderly patient

## 2019-07-26 NOTE — HISTORY OF PRESENT ILLNESS
[de-identified] : Patient presents today for PNET, anemia. She is s/p increased Somatuline  to 120.mg.  She no longer has unexpected bouts of diarrhea or constipation. She continues to put A & D Ointment on her head with resolution of lesion and will see  in the fall. Her son is with her.  She denies flushing,  rash, fever, infections, bleeding, bruising, nausea,vomiting,cough, SOB, chest pain, headache or dizziness.  [FreeTextEntry1] : New patient, multiple medical problems\par  [de-identified] : Patient is an 89 year old new consult for newly diagnosed PNET Pt.had a h/o abd pain and underwent an abdominal u/s on 04/25/2017.Impression showed patient was post cholecystectomy. There is dilation of the extrahepatic duct. Mild intrahepatic ductal dilation is also appreciated. Further evaluation recommended. Pt. then underwent an MRI of abd w/ w/o contrast that showed moderate intra and extrahepatic biliary ductal dilation and choledocholithiasis at least 3 common bile duct stones as described. A 2.3 x 2.3 uncinate process pancreatic mass with imaging features favoring a pancreatic neuroendocrine tumor rather than adenocarcinoma. Patient underwent a fine needle aspiration for cytology of the pancreatic uncinate process mass that showed well differentiated pancreatic neuroendocrine tumor. Immunohistochemical stains for chromogranin, synaptophysin and Ki-67 wer eperformed on the cell block. Chromogranin and synaphysin are positive and Ki-67 shows less that 3% staining. \par \par

## 2019-07-26 NOTE — RESULTS/DATA
[FreeTextEntry1] : WC 6.7\par Hemoglobin 11.5\par Hematot 36.3\par Platelets 275,000\par 7/19 Chromogranin A 687  (prior 731)  \par \par Ferritin 330\par Vit B12 632

## 2019-07-26 NOTE — PLAN
[FreeTextEntry1] : The patient is alert, aware of her medical problems\par pending oncology evaluation with dr Landry at [place]\par The patient educated about fall prevention\par

## 2019-07-26 NOTE — HEALTH RISK ASSESSMENT
[FreeTextEntry1] : her health [de-identified] : pending oncology [] : No [de-identified] : increased risk

## 2019-07-26 NOTE — ASSESSMENT
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 \par Ferritn 300s  ( last IV iron Jan 2018) \par \par Vitamin B12 632 continue oral B12. \par \par Osteoporosis-  Reclast at next visit, due 6/19.\par \par \par Patient in assisted living after recent hip fx/ ORIF\par \par SCC of the scalp\par - continue therapy per .\par \par Monitor CBC, Chem Profile, Chromagranin\par \par History of present illness, review of systems, physical exam and treatment plan reviewed with . \par \par Office visit in 4  weeks for Somatuline and Reclast or prn for new or worsening symptoms. \par \par CBC, Chem Profile, Chromgranin at next visit.\par \par \par

## 2019-08-12 ENCOUNTER — RX RENEWAL (OUTPATIENT)
Age: 84
End: 2019-08-12

## 2019-08-15 ENCOUNTER — RX RENEWAL (OUTPATIENT)
Age: 84
End: 2019-08-15

## 2019-08-19 ENCOUNTER — RESULT REVIEW (OUTPATIENT)
Age: 84
End: 2019-08-19

## 2019-08-19 ENCOUNTER — APPOINTMENT (OUTPATIENT)
Dept: HEMATOLOGY ONCOLOGY | Facility: CLINIC | Age: 84
End: 2019-08-19
Payer: MEDICARE

## 2019-08-19 VITALS
TEMPERATURE: 98.9 F | WEIGHT: 139.99 LBS | SYSTOLIC BLOOD PRESSURE: 166 MMHG | HEART RATE: 61 BPM | DIASTOLIC BLOOD PRESSURE: 70 MMHG | HEIGHT: 62.01 IN | BODY MASS INDEX: 25.44 KG/M2 | RESPIRATION RATE: 20 BRPM | OXYGEN SATURATION: 98 %

## 2019-08-19 PROCEDURE — 99213 OFFICE O/P EST LOW 20 MIN: CPT

## 2019-08-19 NOTE — HEALTH RISK ASSESSMENT
[Fair] : ~his/her~ current health as fair  [Good] : ~his/her~  mood as  good [Intercurrent ED visits] : went to ED [Intercurrent hospitalizations] : was admitted to the hospital  [1] : 2) Feeling down, depressed, or hopeless for several days (1) [FreeTextEntry1] : her health [] : No [de-identified] : pending oncology [de-identified] : increased risk

## 2019-08-19 NOTE — PHYSICAL EXAM
[Restricted in physically strenuous activity but ambulatory and able to carry out work of a light or sedentary nature] : Status 1- Restricted in physically strenuous activity but ambulatory and able to carry out work of a light or sedentary nature, e.g., light house work, office work [Normal] : affect appropriate [No Acute Distress] : no acute distress [Normal Sclera/Conjunctiva] : normal sclera/conjunctiva [Normal Outer Ear/Nose] : the outer ears and nose were normal in appearance [No Respiratory Distress] : no respiratory distress  [Clear to Auscultation] : lungs were clear to auscultation bilaterally [Normal Rate] : normal rate  [Memory Grossly Normal] : memory grossly normal [de-identified] : Crusted, large scalp lesions., resolved. [de-identified] : Frail appearing [de-identified] : decreased ROM [de-identified] : decreased pulmonary sounds [de-identified] : defered [de-identified] : non palpable

## 2019-08-19 NOTE — PLAN
[FreeTextEntry1] : The patient is alert, aware of her medical problems\par pending oncology evaluation with dr Landry at [place]\par The patient educated about fall prevention\par

## 2019-08-19 NOTE — REVIEW OF SYSTEMS
[Hearing Loss] : hearing loss [Dizziness] : dizziness [Unsteady Walking] : ataxia [Negative] : Heme/Lymph [Chills] : no chills [Dry Eyes] : no dryness of the eyes [Vision Problems] : no vision problems [Hoarseness] : no hoarseness [Mucosal Pain] : no mucosal pain [Lower Ext Edema] : no lower extremity edema [Diarrhea] : no diarrhea [Muscle Pain] : no muscle pain [Skin Wound] : no skin wound [Confused] : no confusion [Difficulty Walking] : no difficulty walking [Insomnia] : no insomnia [Hot Flashes] : no hot flashes [FreeTextEntry3] : glasses [FreeTextEntry4] : MAGUI [FreeTextEntry8] : nocturia [FreeTextEntry9] : s/p hip repair with marcella placement [de-identified] : 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 [Fever] : no fever [Fatigue] : no fatigue [Night Sweats] : no night sweats [Recent Change In Weight] : ~T no recent weight change [Earache] : no earache [Nosebleed] : no nosebleeds [Nasal Discharge] : no nasal discharge [Postnasal Drip] : no postnasal drip [Chest Pain] : no chest pain [Palpitations] : no palpitations [Paroysmal Nocturnal Dyspnea] : no paroysmal nocturnal dyspnea [Shortness Of Breath] : no shortness of breath [Cough] : no cough [Constipation] : no constipation [Diarrhea] : diarrhea [Dysuria] : no dysuria [Joint Pain] : no joint pain [Joint Swelling] : no joint swelling [Itching] : no itching [Skin Rash] : no skin rash [Headache] : no headache [Memory Loss] : no memory loss [Anxiety] : no anxiety [Depression] : no depression [Easy Bleeding] : no easy bleeding [Easy Bruising] : no easy bruising [FreeTextEntry2] : Frail looking elderly patient

## 2019-08-19 NOTE — HEALTH RISK ASSESSMENT
[Fair] : ~his/her~ current health as fair  [Good] : ~his/her~  mood as  good [Intercurrent ED visits] : went to ED [Intercurrent hospitalizations] : was admitted to the hospital  [1] : 2) Feeling down, depressed, or hopeless for several days (1) [FreeTextEntry1] : her health [] : No [de-identified] : pending oncology [de-identified] : increased risk

## 2019-08-19 NOTE — PLAN
[FreeTextEntry1] : The patient is alert, aware of her medical problems\par pending oncology evaluation with dr Landry at [place]\par The patient educated about fall prevention\par

## 2019-08-19 NOTE — HISTORY OF PRESENT ILLNESS
[Therapy: ___] : Therapy: [unfilled] [1 - Distress Level] : Distress Level: 1 [de-identified] : Patient presents today for PNET, anemia. She is s/p increased Somatuline  to 120.mg.  She no longer has unexpected bouts of diarrhea or constipation. She continues to put A & D Ointment on her head with resolution of lesion and will see  in the fall. Her son is with her.  She denies flushing,  rash, fever, infections, bleeding, bruising, nausea,vomiting,cough, SOB, chest pain, headache or dizziness.  [FreeTextEntry1] : New patient, multiple medical problems\par  [de-identified] : Patient is an 89 year old new consult for newly diagnosed PNET Pt.had a h/o abd pain and underwent an abdominal u/s on 04/25/2017.Impression showed patient was post cholecystectomy. There is dilation of the extrahepatic duct. Mild intrahepatic ductal dilation is also appreciated. Further evaluation recommended. Pt. then underwent an MRI of abd w/ w/o contrast that showed moderate intra and extrahepatic biliary ductal dilation and choledocholithiasis at least 3 common bile duct stones as described. A 2.3 x 2.3 uncinate process pancreatic mass with imaging features favoring a pancreatic neuroendocrine tumor rather than adenocarcinoma. Patient underwent a fine needle aspiration for cytology of the pancreatic uncinate process mass that showed well differentiated pancreatic neuroendocrine tumor. Immunohistochemical stains for chromogranin, synaptophysin and Ki-67 wer eperformed on the cell block. Chromogranin and synaphysin are positive and Ki-67 shows less that 3% staining. \par \par

## 2019-08-19 NOTE — HISTORY OF PRESENT ILLNESS
[Therapy: ___] : Therapy: [unfilled] [1 - Distress Level] : Distress Level: 1 [de-identified] : Patient presents today for PNET, anemia. She is s/p increased Somatuline  to 120.mg.  She no longer has unexpected bouts of diarrhea or constipation. She continues to put A & D Ointment on her head with resolution of lesion and will see  in the fall. Her son is with her.  She denies flushing,  rash, fever, infections, bleeding, bruising, nausea,vomiting,cough, SOB, chest pain, headache or dizziness.  [FreeTextEntry1] : New patient, multiple medical problems\par  [de-identified] : Patient is an 89 year old new consult for newly diagnosed PNET Pt.had a h/o abd pain and underwent an abdominal u/s on 04/25/2017.Impression showed patient was post cholecystectomy. There is dilation of the extrahepatic duct. Mild intrahepatic ductal dilation is also appreciated. Further evaluation recommended. Pt. then underwent an MRI of abd w/ w/o contrast that showed moderate intra and extrahepatic biliary ductal dilation and choledocholithiasis at least 3 common bile duct stones as described. A 2.3 x 2.3 uncinate process pancreatic mass with imaging features favoring a pancreatic neuroendocrine tumor rather than adenocarcinoma. Patient underwent a fine needle aspiration for cytology of the pancreatic uncinate process mass that showed well differentiated pancreatic neuroendocrine tumor. Immunohistochemical stains for chromogranin, synaptophysin and Ki-67 wer eperformed on the cell block. Chromogranin and synaphysin are positive and Ki-67 shows less that 3% staining. \par \par

## 2019-08-19 NOTE — PHYSICAL EXAM
[Restricted in physically strenuous activity but ambulatory and able to carry out work of a light or sedentary nature] : Status 1- Restricted in physically strenuous activity but ambulatory and able to carry out work of a light or sedentary nature, e.g., light house work, office work [Normal] : affect appropriate [No Acute Distress] : no acute distress [Normal Sclera/Conjunctiva] : normal sclera/conjunctiva [Normal Outer Ear/Nose] : the outer ears and nose were normal in appearance [Clear to Auscultation] : lungs were clear to auscultation bilaterally [No Respiratory Distress] : no respiratory distress  [Normal Rate] : normal rate  [Memory Grossly Normal] : memory grossly normal [de-identified] : Crusted, large scalp lesions., resolved. [de-identified] : Frail appearing [de-identified] : decreased ROM [de-identified] : defered [de-identified] : decreased pulmonary sounds [de-identified] : non palpable

## 2019-08-19 NOTE — REVIEW OF SYSTEMS
[Hearing Loss] : hearing loss [Dizziness] : dizziness [Unsteady Walking] : ataxia [Negative] : Heme/Lymph [Chills] : no chills [Dry Eyes] : no dryness of the eyes [Vision Problems] : no vision problems [Hoarseness] : no hoarseness [Mucosal Pain] : no mucosal pain [Lower Ext Edema] : no lower extremity edema [Diarrhea] : no diarrhea [Muscle Pain] : no muscle pain [Skin Wound] : no skin wound [Confused] : no confusion [Difficulty Walking] : no difficulty walking [Insomnia] : no insomnia [Hot Flashes] : no hot flashes [FreeTextEntry3] : glasses [FreeTextEntry4] : MAGUI [FreeTextEntry8] : nocturia [FreeTextEntry9] : s/p hip repair with marcella placement [de-identified] : 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 [Fever] : no fever [Fatigue] : no fatigue [Night Sweats] : no night sweats [Recent Change In Weight] : ~T no recent weight change [Earache] : no earache [Nosebleed] : no nosebleeds [Nasal Discharge] : no nasal discharge [Postnasal Drip] : no postnasal drip [Chest Pain] : no chest pain [Palpitations] : no palpitations [Paroysmal Nocturnal Dyspnea] : no paroysmal nocturnal dyspnea [Shortness Of Breath] : no shortness of breath [Cough] : no cough [Constipation] : no constipation [Diarrhea] : diarrhea [Dysuria] : no dysuria [Joint Pain] : no joint pain [Joint Swelling] : no joint swelling [Itching] : no itching [Skin Rash] : no skin rash [Headache] : no headache [Memory Loss] : no memory loss [Anxiety] : no anxiety [Depression] : no depression [Easy Bleeding] : no easy bleeding [Easy Bruising] : no easy bruising [FreeTextEntry2] : Frail looking elderly patient

## 2019-08-20 ENCOUNTER — RX RENEWAL (OUTPATIENT)
Age: 84
End: 2019-08-20

## 2019-08-20 RX ORDER — LACTOBACILLUS ACIDOPHILUS 500MM CELL
CAPSULE ORAL AT BEDTIME
Qty: 90 | Refills: 5 | Status: ACTIVE | COMMUNITY
Start: 2019-08-20 | End: 1900-01-01

## 2019-08-22 ENCOUNTER — RX RENEWAL (OUTPATIENT)
Age: 84
End: 2019-08-22

## 2019-09-03 ENCOUNTER — RX RENEWAL (OUTPATIENT)
Age: 84
End: 2019-09-03

## 2019-09-03 RX ORDER — DOCUSATE SODIUM 100 MG/1
100 CAPSULE, LIQUID FILLED ORAL
Qty: 60 | Refills: 11 | Status: ACTIVE | COMMUNITY
Start: 2019-09-03 | End: 1900-01-01

## 2019-09-11 ENCOUNTER — RX RENEWAL (OUTPATIENT)
Age: 84
End: 2019-09-11

## 2019-09-11 RX ORDER — GLUCOSAMINE/MSM/CHONDROIT SULF 500-166.6
10 TABLET ORAL AT BEDTIME
Qty: 30 | Refills: 5 | Status: ACTIVE | COMMUNITY
Start: 2019-09-11 | End: 1900-01-01

## 2019-09-17 ENCOUNTER — RX RENEWAL (OUTPATIENT)
Age: 84
End: 2019-09-17

## 2019-09-23 ENCOUNTER — RX RENEWAL (OUTPATIENT)
Age: 84
End: 2019-09-23

## 2019-09-24 ENCOUNTER — RESULT REVIEW (OUTPATIENT)
Age: 84
End: 2019-09-24

## 2019-09-24 ENCOUNTER — APPOINTMENT (OUTPATIENT)
Dept: HEMATOLOGY ONCOLOGY | Facility: CLINIC | Age: 84
End: 2019-09-24
Payer: MEDICARE

## 2019-09-24 VITALS
BODY MASS INDEX: 25.62 KG/M2 | WEIGHT: 141 LBS | SYSTOLIC BLOOD PRESSURE: 165 MMHG | HEIGHT: 62.01 IN | DIASTOLIC BLOOD PRESSURE: 54 MMHG | RESPIRATION RATE: 18 BRPM | OXYGEN SATURATION: 96 % | HEART RATE: 62 BPM | TEMPERATURE: 98.4 F

## 2019-09-24 PROCEDURE — 99214 OFFICE O/P EST MOD 30 MIN: CPT

## 2019-09-24 NOTE — PHYSICAL EXAM
[Restricted in physically strenuous activity but ambulatory and able to carry out work of a light or sedentary nature] : Status 1- Restricted in physically strenuous activity but ambulatory and able to carry out work of a light or sedentary nature, e.g., light house work, office work [Normal] : affect appropriate [No Acute Distress] : no acute distress [Normal Sclera/Conjunctiva] : normal sclera/conjunctiva [Normal Outer Ear/Nose] : the outer ears and nose were normal in appearance [No Respiratory Distress] : no respiratory distress  [Clear to Auscultation] : lungs were clear to auscultation bilaterally [Normal Rate] : normal rate  [Memory Grossly Normal] : memory grossly normal [de-identified] : Crusted, large scalp lesions., resolved. [de-identified] : Frail appearing [de-identified] : decreased ROM [de-identified] : decreased pulmonary sounds [de-identified] : defered [de-identified] : non palpable

## 2019-09-24 NOTE — PLAN
[FreeTextEntry1] : The patient is alert, aware of her medical problems\par pending oncology evaluation with dr Landry at [place]\par The patient educated about fall prevention\par

## 2019-09-24 NOTE — HISTORY OF PRESENT ILLNESS
[Therapy: ___] : Therapy: [unfilled] [1 - Distress Level] : Distress Level: 1 [de-identified] : Patient presents today for PNET, anemia. She is s/p increased Somatuline  to 120.mg.  She no longer has unexpected bouts of diarrhea or constipation. There is resolution of scalp lesion but is being monitored by  in the fall. She has discomfort upon awakening in the right hand in the thumb and pointer.  She denies flushing,  rash, fever, infections, bleeding, bruising, nausea,vomiting,cough, SOB, chest pain, headache or dizziness.  [FreeTextEntry1] : New patient, multiple medical problems\par  [de-identified] : Patient is an 89 year old new consult for newly diagnosed PNET Pt.had a h/o abd pain and underwent an abdominal u/s on 04/25/2017.Impression showed patient was post cholecystectomy. There is dilation of the extrahepatic duct. Mild intrahepatic ductal dilation is also appreciated. Further evaluation recommended. Pt. then underwent an MRI of abd w/ w/o contrast that showed moderate intra and extrahepatic biliary ductal dilation and choledocholithiasis at least 3 common bile duct stones as described. A 2.3 x 2.3 uncinate process pancreatic mass with imaging features favoring a pancreatic neuroendocrine tumor rather than adenocarcinoma. Patient underwent a fine needle aspiration for cytology of the pancreatic uncinate process mass that showed well differentiated pancreatic neuroendocrine tumor. Immunohistochemical stains for chromogranin, synaptophysin and Ki-67 wer eperformed on the cell block. Chromogranin and synaphysin are positive and Ki-67 shows less that 3% staining. \par \par

## 2019-09-24 NOTE — REVIEW OF SYSTEMS
[Hearing Loss] : hearing loss [Dizziness] : dizziness [Unsteady Walking] : ataxia [Negative] : Heme/Lymph [Chills] : no chills [Vision Problems] : no vision problems [Dry Eyes] : no dryness of the eyes [Hoarseness] : no hoarseness [Mucosal Pain] : no mucosal pain [Lower Ext Edema] : no lower extremity edema [Diarrhea] : no diarrhea [Muscle Pain] : no muscle pain [Skin Wound] : no skin wound [Confused] : no confusion [Insomnia] : no insomnia [Difficulty Walking] : no difficulty walking [Hot Flashes] : no hot flashes [FreeTextEntry3] : glasses [FreeTextEntry8] : nocturia [FreeTextEntry4] : MAGUI [FreeTextEntry9] : s/p hip repair with marcella placement [de-identified] : SCC  crusted lesions of the scalp  gone. [de-identified] : Numbness in right hand thumb and pointer in a.m. [Fatigue] : no fatigue [Fever] : no fever [Recent Change In Weight] : ~T no recent weight change [Night Sweats] : no night sweats [Nosebleed] : no nosebleeds [Earache] : no earache [Postnasal Drip] : no postnasal drip [Nasal Discharge] : no nasal discharge [Palpitations] : no palpitations [Chest Pain] : no chest pain [Shortness Of Breath] : no shortness of breath [Paroysmal Nocturnal Dyspnea] : no paroysmal nocturnal dyspnea [Cough] : no cough [Constipation] : no constipation [Dysuria] : no dysuria [Diarrhea] : diarrhea [Joint Pain] : no joint pain [Joint Swelling] : no joint swelling [Itching] : no itching [Skin Rash] : no skin rash [Memory Loss] : no memory loss [Headache] : no headache [Depression] : no depression [Anxiety] : no anxiety [Easy Bleeding] : no easy bleeding [FreeTextEntry2] : Frail looking elderly patient [Easy Bruising] : no easy bruising

## 2019-09-24 NOTE — HEALTH RISK ASSESSMENT
[Fair] : ~his/her~ current health as fair  [Good] : ~his/her~  mood as  good [Intercurrent ED visits] : went to ED [Intercurrent hospitalizations] : was admitted to the hospital  [1] : 1) Little interest or pleasure doing things for several days (1) [FreeTextEntry1] : her health [] : No [de-identified] : pending oncology [de-identified] : increased risk

## 2019-09-24 NOTE — ASSESSMENT
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 \par Ferritn 300s  ( last IV iron Jan 2018) \par \par Vitamin B12 632 continue oral B12. \par \par Osteoporosis-  Reclast done 7/19.\par \par \par Patient in assisted living after recent hip fx/ ORIF\par \par SCC of the scalp\par - continue therapy per .\par \par Monitor CBC, Chem Profile, Chromagranin\par \par History of present illness, review of systems, physical exam and treatment plan reviewed with . \par \par Office visit in 4  weeks for Somatuline and Reclast or prn for new or worsening symptoms. \par \par CBC, Chem Profile, Chromgranin, Irons, B12 at next visit.\par \par \par

## 2019-09-24 NOTE — RESULTS/DATA
[FreeTextEntry1] : WC 5.7\par Hemoglobin 11.3\par Hematot 34.5\par Platelets 269,000\par \par 8/19/19: Chromagranin A 527 (687)\par \par Ferritin 330\par Vit B12 632

## 2019-10-08 ENCOUNTER — APPOINTMENT (OUTPATIENT)
Dept: GERIATRICS | Facility: ASSISTED LIVING FACILITY | Age: 84
End: 2019-10-08
Payer: MEDICARE

## 2019-10-08 DIAGNOSIS — Z23 ENCOUNTER FOR IMMUNIZATION: ICD-10-CM

## 2019-10-08 PROCEDURE — 90686 IIV4 VACC NO PRSV 0.5 ML IM: CPT

## 2019-10-08 PROCEDURE — G0008: CPT

## 2019-10-09 PROBLEM — Z23 INFLUENZA VACCINATION ADMINISTERED AT CURRENT VISIT: Status: ACTIVE | Noted: 2019-10-09

## 2019-11-12 ENCOUNTER — RX RENEWAL (OUTPATIENT)
Age: 84
End: 2019-11-12

## 2019-11-12 RX ORDER — LACTULOSE 10 G/15ML
10 SOLUTION ORAL; RECTAL DAILY
Qty: 450 | Refills: 5 | Status: ACTIVE | COMMUNITY
Start: 2019-07-14 | End: 1900-01-01

## 2019-11-14 ENCOUNTER — RX RENEWAL (OUTPATIENT)
Age: 84
End: 2019-11-14

## 2019-11-18 ENCOUNTER — RESULT REVIEW (OUTPATIENT)
Age: 84
End: 2019-11-18

## 2019-11-18 ENCOUNTER — APPOINTMENT (OUTPATIENT)
Dept: HEMATOLOGY ONCOLOGY | Facility: CLINIC | Age: 84
End: 2019-11-18
Payer: MEDICARE

## 2019-11-18 VITALS
OXYGEN SATURATION: 99 % | DIASTOLIC BLOOD PRESSURE: 81 MMHG | HEIGHT: 62.01 IN | TEMPERATURE: 97.8 F | RESPIRATION RATE: 16 BRPM | SYSTOLIC BLOOD PRESSURE: 167 MMHG | HEART RATE: 65 BPM | BODY MASS INDEX: 25.8 KG/M2 | WEIGHT: 142 LBS

## 2019-11-18 PROCEDURE — 99213 OFFICE O/P EST LOW 20 MIN: CPT

## 2019-11-18 NOTE — RESULTS/DATA
[FreeTextEntry1] : WC 6.4\par Hemoglobin 11.5\par Hematot 34.3\par Platelets 246,000\par Na 132\par \par 8/19/19: Chromagranin A 527 (687)\par \par Ferritin 276\par Vit B12 609

## 2019-11-18 NOTE — REVIEW OF SYSTEMS
[Diarrhea] : diarrhea [Hearing Loss] : hearing loss [Dizziness] : dizziness [Unsteady Walking] : ataxia [Negative] : Psychiatric [Chills] : no chills [Dry Eyes] : no dryness of the eyes [Vision Problems] : no vision problems [Mucosal Pain] : no mucosal pain [Hoarseness] : no hoarseness [Lower Ext Edema] : no lower extremity edema [Muscle Pain] : no muscle pain [Skin Wound] : no skin wound [Difficulty Walking] : no difficulty walking [Confused] : no confusion [Insomnia] : no insomnia [Hot Flashes] : no hot flashes [FreeTextEntry4] : MAGUI [FreeTextEntry3] : glasses [FreeTextEntry8] : nocturia [de-identified] : 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 [FreeTextEntry9] : s/p hip repair with marcella placement [de-identified] : Numbness in right hand thumb and pointer in a.m. [Fever] : no fever [Night Sweats] : no night sweats [Fatigue] : no fatigue [Earache] : no earache [Recent Change In Weight] : ~T no recent weight change [Nosebleed] : no nosebleeds [Nasal Discharge] : no nasal discharge [Palpitations] : no palpitations [Chest Pain] : no chest pain [Postnasal Drip] : no postnasal drip [Paroysmal Nocturnal Dyspnea] : no paroysmal nocturnal dyspnea [Shortness Of Breath] : no shortness of breath [Cough] : no cough [Constipation] : no constipation [Dysuria] : no dysuria [Diarrhea] : diarrhea [Joint Swelling] : no joint swelling [Joint Pain] : no joint pain [Itching] : no itching [Skin Rash] : no skin rash [Headache] : no headache [Memory Loss] : no memory loss [Anxiety] : no anxiety [Depression] : no depression [Easy Bleeding] : no easy bleeding [Easy Bruising] : no easy bruising [FreeTextEntry2] : Frail looking elderly patient

## 2019-11-18 NOTE — PHYSICAL EXAM
[Restricted in physically strenuous activity but ambulatory and able to carry out work of a light or sedentary nature] : Status 1- Restricted in physically strenuous activity but ambulatory and able to carry out work of a light or sedentary nature, e.g., light house work, office work [Normal] : grossly intact [No Acute Distress] : no acute distress [Normal Sclera/Conjunctiva] : normal sclera/conjunctiva [Normal Outer Ear/Nose] : the outer ears and nose were normal in appearance [No Respiratory Distress] : no respiratory distress  [Clear to Auscultation] : lungs were clear to auscultation bilaterally [Normal Rate] : normal rate  [Memory Grossly Normal] : memory grossly normal [de-identified] : Crusted, large scalp lesions., resolved. [de-identified] : Frail appearing [de-identified] : decreased ROM [de-identified] : defered [de-identified] : decreased pulmonary sounds [de-identified] : non palpable

## 2019-11-18 NOTE — PLAN
[FreeTextEntry1] : The patient is alert, aware of her medical problems\par pending oncology evaluation with dr Landry at [place]\par The patient educated about fall prevention\par

## 2019-11-18 NOTE — HEALTH RISK ASSESSMENT
[Good] : ~his/her~  mood as  good [Fair] : ~his/her~ current health as fair  [Intercurrent ED visits] : went to ED [Intercurrent hospitalizations] : was admitted to the hospital  [1] : 2) Feeling down, depressed, or hopeless for several days (1) [FreeTextEntry1] : her health [] : No [de-identified] : pending oncology [de-identified] : increased risk

## 2019-11-18 NOTE — ASSESSMENT
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 \par Ferritn 275  ( last IV iron Jan 2018) \par \par Vitamin B12 607 continue oral B12. \par \par Osteoporosis-  Reclast done 7/19.\par \par \par Patient in assisted living after recent hip fx/ ORIF\par \par SCC of the scalp\par - recurrent lesions\par - continue therapy per .\par \par Dizziness\par - advised to follow up with PCP , blood work to patient\par \par Monitor CBC, Chem Profile, Chromagranin\par \par History of present illness, review of systems, physical exam and treatment plan reviewed with . \par \par Office visit in 4  weeks for Somatuline and Reclast or prn for new or worsening symptoms. \par \par CBC, Chem Profile, Chromgranin, Irons, B12 at next visit.\par \par \par

## 2019-11-18 NOTE — HISTORY OF PRESENT ILLNESS
[Therapy: ___] : Therapy: [unfilled] [1 - Distress Level] : Distress Level: 1 [de-identified] : Patient presents today for PNET, anemia. She is s/p increased Somatuline  to 120.mg.  She had one episode of constipation followed by two two bouts of diarrhea daily.  She also has dizziness upon awakening at night. She feels that the area on her scalp has developed two new areas and is advised to use shampoo 5 x weekly by . She continues to have discomfort upon awakening in the right hand in the thumb and pointer.  She denies flushing,  rash, fever, infections, bleeding, bruising, nausea,vomiting,cough, SOB, chest pain, headache or dizziness.  [FreeTextEntry1] : New patient, multiple medical problems\par  [de-identified] : Patient is an 89 year old new consult for newly diagnosed PNET Pt.had a h/o abd pain and underwent an abdominal u/s on 04/25/2017.Impression showed patient was post cholecystectomy. There is dilation of the extrahepatic duct. Mild intrahepatic ductal dilation is also appreciated. Further evaluation recommended. Pt. then underwent an MRI of abd w/ w/o contrast that showed moderate intra and extrahepatic biliary ductal dilation and choledocholithiasis at least 3 common bile duct stones as described. A 2.3 x 2.3 uncinate process pancreatic mass with imaging features favoring a pancreatic neuroendocrine tumor rather than adenocarcinoma. Patient underwent a fine needle aspiration for cytology of the pancreatic uncinate process mass that showed well differentiated pancreatic neuroendocrine tumor. Immunohistochemical stains for chromogranin, synaptophysin and Ki-67 wer eperformed on the cell block. Chromogranin and synaphysin are positive and Ki-67 shows less that 3% staining. \par \par

## 2019-11-19 ENCOUNTER — APPOINTMENT (OUTPATIENT)
Dept: FAMILY MEDICINE | Facility: ASSISTED LIVING FACILITY | Age: 84
End: 2019-11-19
Payer: MEDICARE

## 2019-11-20 ENCOUNTER — RX RENEWAL (OUTPATIENT)
Age: 84
End: 2019-11-20

## 2019-11-25 ENCOUNTER — RESULT REVIEW (OUTPATIENT)
Age: 84
End: 2019-11-25

## 2019-12-17 ENCOUNTER — APPOINTMENT (OUTPATIENT)
Dept: HEMATOLOGY ONCOLOGY | Facility: CLINIC | Age: 84
End: 2019-12-17
Payer: MEDICARE

## 2019-12-17 ENCOUNTER — APPOINTMENT (OUTPATIENT)
Dept: FAMILY MEDICINE | Facility: ASSISTED LIVING FACILITY | Age: 84
End: 2019-12-17
Payer: MEDICARE

## 2019-12-17 DIAGNOSIS — K58.9 IRRITABLE BOWEL SYNDROME W/OUT DIARRHEA: ICD-10-CM

## 2019-12-17 RX ORDER — ACETAMINOPHEN 325 MG/1
325 TABLET, FILM COATED ORAL
Qty: 240 | Refills: 3 | Status: ACTIVE | COMMUNITY
Start: 2019-12-17 | End: 1900-01-01

## 2019-12-17 RX ORDER — LUBIPROSTONE 24 UG/1
24 CAPSULE, GELATIN COATED ORAL TWICE DAILY
Qty: 60 | Refills: 5 | Status: ACTIVE | COMMUNITY
Start: 2017-07-05 | End: 1900-01-01

## 2019-12-31 ENCOUNTER — RESULT REVIEW (OUTPATIENT)
Age: 84
End: 2019-12-31

## 2019-12-31 ENCOUNTER — APPOINTMENT (OUTPATIENT)
Dept: HEMATOLOGY ONCOLOGY | Facility: CLINIC | Age: 84
End: 2019-12-31
Payer: MEDICARE

## 2019-12-31 VITALS
BODY MASS INDEX: 25.62 KG/M2 | DIASTOLIC BLOOD PRESSURE: 69 MMHG | WEIGHT: 140.99 LBS | HEART RATE: 62 BPM | TEMPERATURE: 97.6 F | OXYGEN SATURATION: 98 % | RESPIRATION RATE: 20 BRPM | HEIGHT: 62.01 IN | SYSTOLIC BLOOD PRESSURE: 159 MMHG

## 2019-12-31 DIAGNOSIS — R10.13 EPIGASTRIC PAIN: ICD-10-CM

## 2019-12-31 PROCEDURE — 99214 OFFICE O/P EST MOD 30 MIN: CPT

## 2019-12-31 RX ORDER — PANTOPRAZOLE 40 MG/1
40 TABLET, DELAYED RELEASE ORAL DAILY
Qty: 30 | Refills: 1 | Status: ACTIVE | COMMUNITY
Start: 2019-01-16 | End: 1900-01-01

## 2019-12-31 NOTE — PHYSICAL EXAM
[Restricted in physically strenuous activity but ambulatory and able to carry out work of a light or sedentary nature] : Status 1- Restricted in physically strenuous activity but ambulatory and able to carry out work of a light or sedentary nature, e.g., light house work, office work [Normal] : affect appropriate [No Acute Distress] : no acute distress [Normal Sclera/Conjunctiva] : normal sclera/conjunctiva [Normal Outer Ear/Nose] : the outer ears and nose were normal in appearance [No Respiratory Distress] : no respiratory distress  [Normal Rate] : normal rate  [Clear to Auscultation] : lungs were clear to auscultation bilaterally [Memory Grossly Normal] : memory grossly normal [de-identified] : Crusted, large scalp lesions., resolved. [de-identified] : decreased pulmonary sounds [de-identified] : decreased ROM [de-identified] : Frail appearing [de-identified] : non palpable [de-identified] : defered

## 2019-12-31 NOTE — HEALTH RISK ASSESSMENT
[Good] : ~his/her~  mood as  good [Fair] : ~his/her~ current health as fair  [Intercurrent ED visits] : went to ED [Intercurrent hospitalizations] : was admitted to the hospital  [1] : 2) Feeling down, depressed, or hopeless for several days (1) [FreeTextEntry1] : her health [] : No [de-identified] : pending oncology [de-identified] : increased risk

## 2019-12-31 NOTE — REVIEW OF SYSTEMS
[Diarrhea] : diarrhea [Hearing Loss] : hearing loss [Dizziness] : dizziness [Unsteady Walking] : ataxia [Negative] : Psychiatric [Chills] : no chills [Vision Problems] : no vision problems [Dry Eyes] : no dryness of the eyes [Hoarseness] : no hoarseness [Mucosal Pain] : no mucosal pain [Lower Ext Edema] : no lower extremity edema [Skin Wound] : no skin wound [Confused] : no confusion [Muscle Pain] : no muscle pain [Insomnia] : no insomnia [Difficulty Walking] : no difficulty walking [Hot Flashes] : no hot flashes [FreeTextEntry3] : glasses [FreeTextEntry4] : MAGUI [FreeTextEntry7] : nausea [FreeTextEntry9] : s/p hip repair with marcella placement [de-identified] : 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 [FreeTextEntry8] : nocturia [de-identified] : Numbness in right hand thumb and pointer in a.m. [Fatigue] : no fatigue [Night Sweats] : no night sweats [Fever] : no fever [Earache] : no earache [Recent Change In Weight] : ~T no recent weight change [Nosebleed] : no nosebleeds [Nasal Discharge] : no nasal discharge [Chest Pain] : no chest pain [Postnasal Drip] : no postnasal drip [Palpitations] : no palpitations [Cough] : no cough [Shortness Of Breath] : no shortness of breath [Paroysmal Nocturnal Dyspnea] : no paroysmal nocturnal dyspnea [Constipation] : no constipation [Diarrhea] : diarrhea [Dysuria] : no dysuria [Joint Swelling] : no joint swelling [Joint Pain] : no joint pain [Itching] : no itching [Headache] : no headache [Skin Rash] : no skin rash [Depression] : no depression [Anxiety] : no anxiety [Memory Loss] : no memory loss [Easy Bruising] : no easy bruising [Easy Bleeding] : no easy bleeding [FreeTextEntry2] : Frail looking elderly patient

## 2019-12-31 NOTE — HISTORY OF PRESENT ILLNESS
[Therapy: ___] : Therapy: [unfilled] [1 - Distress Level] : Distress Level: 1 [de-identified] : Patient presents today for PNET, anemia. Patient has complaints of increased nausea, gas, belching and dizziness x 6 weeks.  She will be making apt with GI soon. [FreeTextEntry1] : New patient, multiple medical problems\par  [de-identified] : Patient is an 89 year old new consult for newly diagnosed PNET Pt.had a h/o abd pain and underwent an abdominal u/s on 04/25/2017.Impression showed patient was post cholecystectomy. There is dilation of the extrahepatic duct. Mild intrahepatic ductal dilation is also appreciated. Further evaluation recommended. Pt. then underwent an MRI of abd w/ w/o contrast that showed moderate intra and extrahepatic biliary ductal dilation and choledocholithiasis at least 3 common bile duct stones as described. A 2.3 x 2.3 uncinate process pancreatic mass with imaging features favoring a pancreatic neuroendocrine tumor rather than adenocarcinoma. Patient underwent a fine needle aspiration for cytology of the pancreatic uncinate process mass that showed well differentiated pancreatic neuroendocrine tumor. Immunohistochemical stains for chromogranin, synaptophysin and Ki-67 wer eperformed on the cell block. Chromogranin and synaphysin are positive and Ki-67 shows less that 3% staining. \par \par

## 2019-12-31 NOTE — REASON FOR VISIT
[Follow-Up Visit] : a follow-up [Other: _____] : [unfilled] [Family Member] : family member [FreeTextEntry2] : PNET, Anemia, constipation

## 2019-12-31 NOTE — PLAN
[FreeTextEntry1] : The patient is alert, aware of her medical problems\par pending oncology evaluation with dr Landry at [place]\par The patient educated about fall prevention\par

## 2019-12-31 NOTE — ASSESSMENT
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 \par Ferritn 275 November 2019( last IV iron Jan 2018) \par \par Vitamin B12 607 continue oral B12. \par \par Osteoporosis-  Reclast done 7/19.\par \par Patient in assisted living after recent hip fx/ ORIF\par \par SCC of the scalp\par - recurrent lesions\par - continue therapy per .\par \par Dizziness\par -mostly at night when gets up at night \par \par Monitor CBC, Chem Profile, Chromogranin\par \par \par \par

## 2020-01-03 ENCOUNTER — RESULT REVIEW (OUTPATIENT)
Age: 85
End: 2020-01-03

## 2020-01-08 ENCOUNTER — RX RENEWAL (OUTPATIENT)
Age: 85
End: 2020-01-08

## 2020-01-13 ENCOUNTER — RX RENEWAL (OUTPATIENT)
Age: 85
End: 2020-01-13

## 2020-01-16 ENCOUNTER — APPOINTMENT (OUTPATIENT)
Dept: HEMATOLOGY ONCOLOGY | Facility: CLINIC | Age: 85
End: 2020-01-16

## 2020-01-21 ENCOUNTER — RX RENEWAL (OUTPATIENT)
Age: 85
End: 2020-01-21

## 2020-01-23 ENCOUNTER — RX RENEWAL (OUTPATIENT)
Age: 85
End: 2020-01-23

## 2020-01-30 ENCOUNTER — APPOINTMENT (OUTPATIENT)
Dept: HEMATOLOGY ONCOLOGY | Facility: CLINIC | Age: 85
End: 2020-01-30
Payer: MEDICARE

## 2020-01-30 ENCOUNTER — RESULT REVIEW (OUTPATIENT)
Age: 85
End: 2020-01-30

## 2020-01-30 VITALS
HEART RATE: 62 BPM | HEIGHT: 62 IN | WEIGHT: 140 LBS | SYSTOLIC BLOOD PRESSURE: 184 MMHG | BODY MASS INDEX: 25.76 KG/M2 | OXYGEN SATURATION: 100 % | TEMPERATURE: 97.4 F | RESPIRATION RATE: 18 BRPM | DIASTOLIC BLOOD PRESSURE: 74 MMHG

## 2020-01-30 PROCEDURE — 99214 OFFICE O/P EST MOD 30 MIN: CPT

## 2020-01-30 NOTE — REVIEW OF SYSTEMS
[Diarrhea] : diarrhea [Hearing Loss] : hearing loss [Dizziness] : dizziness [Unsteady Walking] : ataxia [Negative] : Heme/Lymph [Vomiting] : vomiting [Chills] : no chills [Vision Problems] : no vision problems [Dry Eyes] : no dryness of the eyes [Mucosal Pain] : no mucosal pain [Hoarseness] : no hoarseness [Lower Ext Edema] : no lower extremity edema [Skin Wound] : no skin wound [Muscle Pain] : no muscle pain [Confused] : no confusion [Difficulty Walking] : no difficulty walking [Insomnia] : no insomnia [Hot Flashes] : no hot flashes [FreeTextEntry3] : glasses [FreeTextEntry4] : MAGUI [FreeTextEntry8] : nocturia [FreeTextEntry7] : nausea [FreeTextEntry9] : s/p hip repair with marcella placement [de-identified] : 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 [de-identified] : Numbness in right hand thumb and pointer in a.m. [Fever] : no fever [Night Sweats] : no night sweats [Fatigue] : no fatigue [Recent Change In Weight] : ~T no recent weight change [Earache] : no earache [Nasal Discharge] : no nasal discharge [Nosebleed] : no nosebleeds [Chest Pain] : no chest pain [Postnasal Drip] : no postnasal drip [Paroysmal Nocturnal Dyspnea] : no paroysmal nocturnal dyspnea [Palpitations] : no palpitations [Shortness Of Breath] : no shortness of breath [Cough] : no cough [Constipation] : no constipation [Diarrhea] : diarrhea [Joint Pain] : no joint pain [Dysuria] : no dysuria [Itching] : no itching [Joint Swelling] : no joint swelling [Memory Loss] : no memory loss [Headache] : no headache [Skin Rash] : no skin rash [Anxiety] : no anxiety [Depression] : no depression [Easy Bleeding] : no easy bleeding [Easy Bruising] : no easy bruising [FreeTextEntry2] : Frail looking elderly patient

## 2020-01-30 NOTE — HISTORY OF PRESENT ILLNESS
[Therapy: ___] : Therapy: [unfilled] [1 - Distress Level] : Distress Level: 1 [de-identified] : Patient presents today for PNET, anemia. She was admitted to the hospital after 2 days of vomiting with confusion. She was found to be hyponatremic.  [FreeTextEntry1] : New patient, multiple medical problems\par  [de-identified] : Patient is an 89 year old new consult for newly diagnosed PNET Pt.had a h/o abd pain and underwent an abdominal u/s on 04/25/2017.Impression showed patient was post cholecystectomy. There is dilation of the extrahepatic duct. Mild intrahepatic ductal dilation is also appreciated. Further evaluation recommended. Pt. then underwent an MRI of abd w/ w/o contrast that showed moderate intra and extrahepatic biliary ductal dilation and choledocholithiasis at least 3 common bile duct stones as described. A 2.3 x 2.3 uncinate process pancreatic mass with imaging features favoring a pancreatic neuroendocrine tumor rather than adenocarcinoma. Patient underwent a fine needle aspiration for cytology of the pancreatic uncinate process mass that showed well differentiated pancreatic neuroendocrine tumor. Immunohistochemical stains for chromogranin, synaptophysin and Ki-67 wer eperformed on the cell block. Chromogranin and synaphysin are positive and Ki-67 shows less that 3% staining. \par \par

## 2020-01-30 NOTE — ASSESSMENT
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 \par Ferritn 484\par \par Vitamin B12 769 continue oral B12. \par \par Osteoporosis-  Reclast done 7/19.\par \par Patient in assisted living after recent hip fx/ ORIF\par \par SCC of the scalp\par - recurrent lesions\par - continue therapy per .\par \par Dizziness\par -mostly at night when gets up at night \par \par Hyponatremia\par electrolyte solution, follow with PCP\par \par History of present illness, review of systems, physical exam and treatment plan reviewed with . \par \par Office visit in 4  weeks or prn for new or worsening symptoms. \par \par Monitor CBC, Chem Profile, Chromogranin\par \par \par \par  mildly reduced attention to bolus Within functional limits

## 2020-01-30 NOTE — PLAN
[FreeTextEntry1] : The patient is alert, aware of her medical problems\par pending oncology evaluation with dr Landry at [place]\par The patient educated about fall prevention\par

## 2020-01-30 NOTE — HEALTH RISK ASSESSMENT
[Fair] : ~his/her~ current health as fair  [Good] : ~his/her~  mood as  good [Intercurrent ED visits] : went to ED [Intercurrent hospitalizations] : was admitted to the hospital  [1] : 2) Feeling down, depressed, or hopeless for several days (1) [FreeTextEntry1] : her health [de-identified] : pending oncology [] : No [de-identified] : increased risk

## 2020-01-30 NOTE — PHYSICAL EXAM
[Normal] : affect appropriate [No Acute Distress] : no acute distress [Normal Sclera/Conjunctiva] : normal sclera/conjunctiva [Normal Outer Ear/Nose] : the outer ears and nose were normal in appearance [No Respiratory Distress] : no respiratory distress  [Clear to Auscultation] : lungs were clear to auscultation bilaterally [Memory Grossly Normal] : memory grossly normal [Normal Rate] : normal rate  [Ambulatory and capable of all self care but unable to carry out any work activities] : Status 2- Ambulatory and capable of all self care but unable to carry out any work activities. Up and about more than 50% of waking hours [de-identified] : Crusted, large scalp lesions., resolved. [de-identified] : Frail appearing [de-identified] : decreased ROM [de-identified] : decreased pulmonary sounds [de-identified] : defered [de-identified] : non palpable

## 2020-02-13 ENCOUNTER — RX RENEWAL (OUTPATIENT)
Age: 85
End: 2020-02-13

## 2020-02-14 ENCOUNTER — RX RENEWAL (OUTPATIENT)
Age: 85
End: 2020-02-14

## 2020-03-03 ENCOUNTER — APPOINTMENT (OUTPATIENT)
Dept: GERIATRICS | Facility: CLINIC | Age: 85
End: 2020-03-03
Payer: MEDICARE

## 2020-03-03 VITALS
SYSTOLIC BLOOD PRESSURE: 130 MMHG | TEMPERATURE: 97.2 F | WEIGHT: 140 LBS | RESPIRATION RATE: 20 BRPM | BODY MASS INDEX: 24.8 KG/M2 | HEIGHT: 63 IN | DIASTOLIC BLOOD PRESSURE: 60 MMHG | HEART RATE: 60 BPM

## 2020-03-03 PROCEDURE — 99213 OFFICE O/P EST LOW 20 MIN: CPT

## 2020-03-03 RX ORDER — HYDROCORTISONE 10 MG/G
1 CREAM TOPICAL
Qty: 15 | Refills: 0 | Status: COMPLETED | COMMUNITY
Start: 2020-03-03 | End: 2020-03-08

## 2020-03-03 NOTE — ASSESSMENT
[FreeTextEntry1] : wash with soap and water, then apply hydrocortisone\par Inform MD or NP if worsens becomes puritic or does not\par resolve.

## 2020-03-03 NOTE — PHYSICAL EXAM
[General Appearance - Alert] : alert [] : no respiratory distress [General Appearance - In No Acute Distress] : in no acute distress [Auscultation Breath Sounds / Voice Sounds] : lungs were clear to auscultation bilaterally [Respiration, Rhythm And Depth] : normal respiratory rhythm and effort [Apical Impulse] : the apical impulse was normal [Heart Rate And Rhythm] : heart rate was normal and rhythm regular [FreeTextEntry1] : faint erythema with very small raised bumps on lower left cheek and across both sides of chin

## 2020-03-19 ENCOUNTER — RESULT REVIEW (OUTPATIENT)
Age: 85
End: 2020-03-19

## 2020-03-19 ENCOUNTER — APPOINTMENT (OUTPATIENT)
Dept: HEMATOLOGY ONCOLOGY | Facility: CLINIC | Age: 85
End: 2020-03-19
Payer: MEDICARE

## 2020-03-19 VITALS
DIASTOLIC BLOOD PRESSURE: 63 MMHG | HEART RATE: 62 BPM | OXYGEN SATURATION: 95 % | WEIGHT: 143.4 LBS | RESPIRATION RATE: 20 BRPM | SYSTOLIC BLOOD PRESSURE: 182 MMHG | HEIGHT: 62.99 IN | BODY MASS INDEX: 25.41 KG/M2 | TEMPERATURE: 98.8 F

## 2020-03-19 PROCEDURE — 99214 OFFICE O/P EST MOD 30 MIN: CPT

## 2020-03-19 NOTE — HEALTH RISK ASSESSMENT
[Fair] : ~his/her~ current health as fair  [Good] : ~his/her~  mood as  good [Intercurrent ED visits] : went to ED [Intercurrent hospitalizations] : was admitted to the hospital  [1] : 2) Feeling down, depressed, or hopeless for several days (1) [FreeTextEntry1] : her health [] : No [de-identified] : pending oncology [de-identified] : increased risk

## 2020-03-19 NOTE — HISTORY OF PRESENT ILLNESS
[Therapy: ___] : Therapy: [unfilled] [1 - Distress Level] : Distress Level: 1 [de-identified] : Patient presents today for PNET, anemia. She denies current complaint. She has occasional constipation but no diarrhea or flushing.   [FreeTextEntry1] : New patient, multiple medical problems\par  [de-identified] : Patient is an 89 year old new consult for newly diagnosed PNET Pt.had a h/o abd pain and underwent an abdominal u/s on 04/25/2017.Impression showed patient was post cholecystectomy. There is dilation of the extrahepatic duct. Mild intrahepatic ductal dilation is also appreciated. Further evaluation recommended. Pt. then underwent an MRI of abd w/ w/o contrast that showed moderate intra and extrahepatic biliary ductal dilation and choledocholithiasis at least 3 common bile duct stones as described. A 2.3 x 2.3 uncinate process pancreatic mass with imaging features favoring a pancreatic neuroendocrine tumor rather than adenocarcinoma. Patient underwent a fine needle aspiration for cytology of the pancreatic uncinate process mass that showed well differentiated pancreatic neuroendocrine tumor. Immunohistochemical stains for chromogranin, synaptophysin and Ki-67 wer eperformed on the cell block. Chromogranin and synaphysin are positive and Ki-67 shows less that 3% staining. \par \par

## 2020-03-19 NOTE — PHYSICAL EXAM
[Ambulatory and capable of all self care but unable to carry out any work activities] : Status 2- Ambulatory and capable of all self care but unable to carry out any work activities. Up and about more than 50% of waking hours [Normal] : affect appropriate [No Acute Distress] : no acute distress [Normal Sclera/Conjunctiva] : normal sclera/conjunctiva [Normal Outer Ear/Nose] : the outer ears and nose were normal in appearance [No Respiratory Distress] : no respiratory distress  [Clear to Auscultation] : lungs were clear to auscultation bilaterally [Normal Rate] : normal rate  [Memory Grossly Normal] : memory grossly normal [de-identified] : Crusted, large scalp lesions., resolved. [de-identified] : Frail appearing [de-identified] : decreased ROM [de-identified] : decreased pulmonary sounds [de-identified] : defered [de-identified] : non palpable

## 2020-03-19 NOTE — PLAN
[FreeTextEntry1] : The patient is alert, aware of her medical problems\par pending oncology evaluation with dr Landry at [place]\par The patient educated about fall prevention\par

## 2020-03-19 NOTE — ASSESSMENT
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 \par Ferritn 484\par \par Vitamin B12 769 continue oral B12. \par \par Osteoporosis-  Reclast done 7/19.\par \par Patient in assisted living after recent hip fx/ ORIF\par \par SCC of the scalp\par - recurrent lesions\par - continue therapy per .\par \par Dizziness\par -mostly at night when gets up at night \par \par Elevated BP- discuss with PCP, intermittent.\par \par History of present illness, review of systems, physical exam and treatment plan reviewed with . \par \par Office visit in 4  weeks or prn for new or worsening symptoms. \par \par Monitor CBC, Chem Profile, Chromogranin\par \par \par \par

## 2020-04-21 ENCOUNTER — APPOINTMENT (OUTPATIENT)
Dept: HEMATOLOGY ONCOLOGY | Facility: CLINIC | Age: 85
End: 2020-04-21
Payer: MEDICARE

## 2020-04-21 ENCOUNTER — RESULT REVIEW (OUTPATIENT)
Age: 85
End: 2020-04-21

## 2020-04-21 VITALS
TEMPERATURE: 98.1 F | RESPIRATION RATE: 18 BRPM | SYSTOLIC BLOOD PRESSURE: 168 MMHG | WEIGHT: 142 LBS | HEIGHT: 62.99 IN | BODY MASS INDEX: 25.16 KG/M2 | HEART RATE: 77 BPM | DIASTOLIC BLOOD PRESSURE: 67 MMHG | OXYGEN SATURATION: 95 %

## 2020-04-21 PROCEDURE — 99213 OFFICE O/P EST LOW 20 MIN: CPT

## 2020-04-21 NOTE — HISTORY OF PRESENT ILLNESS
[Therapy: ___] : Therapy: [unfilled] [1 - Distress Level] : Distress Level: 1 [de-identified] : Patient presents today for PNET and anemia, accompanied by her daughter, stating she's doing very well. PT has some mild fatigue but nothing more than her usual self. Denies n/v, fever, headaches, dizziness, chest pain/palpitation, rash, bleeding,  or GI symptoms, SOB/JOHNSON> [FreeTextEntry1] : New patient, multiple medical problems\par  [de-identified] : Patient is an 89 year old new consult for newly diagnosed PNET Pt.had a h/o abd pain and underwent an abdominal u/s on 04/25/2017.Impression showed patient was post cholecystectomy. There is dilation of the extrahepatic duct. Mild intrahepatic ductal dilation is also appreciated. Further evaluation recommended. Pt. then underwent an MRI of abd w/ w/o contrast that showed moderate intra and extrahepatic biliary ductal dilation and choledocholithiasis at least 3 common bile duct stones as described. A 2.3 x 2.3 uncinate process pancreatic mass with imaging features favoring a pancreatic neuroendocrine tumor rather than adenocarcinoma. Patient underwent a fine needle aspiration for cytology of the pancreatic uncinate process mass that showed well differentiated pancreatic neuroendocrine tumor. Immunohistochemical stains for chromogranin, synaptophysin and Ki-67 wer eperformed on the cell block. Chromogranin and synaphysin are positive and Ki-67 shows less that 3% staining. \par \par

## 2020-04-21 NOTE — ASSESSMENT
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 \par Ferritn 484\par \par Vitamin B12 769 continue oral B12. \par \par Osteoporosis-  Reclast 5mg yearly, last dose done 7/19.\par \par Patient in assisted living after recent hip fx/ ORIF\par \par SCC of the scalp\par - recurrent lesions\par - continue therapy per .\par \par Elevated BP- discuss with PCP, intermittent.\par \par History of present illness, review of systems, physical exam and treatment plan reviewed with . \par \par Office visit in 4  weeks or prn for new or worsening symptoms. \par \par Monitor CBC, Chem Profile, Chromogranin\par \par \par \par

## 2020-04-21 NOTE — PLAN
[FreeTextEntry1] : The patient is alert, aware of her medical problems\par pending oncology evaluation with dr Landry at [place]\par The patient educated about fall prevention\par

## 2020-04-21 NOTE — PHYSICAL EXAM
[Ambulatory and capable of all self care but unable to carry out any work activities] : Status 2- Ambulatory and capable of all self care but unable to carry out any work activities. Up and about more than 50% of waking hours [Normal] : affect appropriate [Normal Sclera/Conjunctiva] : normal sclera/conjunctiva [No Acute Distress] : no acute distress [Normal Outer Ear/Nose] : the outer ears and nose were normal in appearance [Clear to Auscultation] : lungs were clear to auscultation bilaterally [No Respiratory Distress] : no respiratory distress  [Normal Rate] : normal rate  [Memory Grossly Normal] : memory grossly normal [de-identified] : Frail appearing [de-identified] : Crusted, large scalp lesions., resolved. [de-identified] : decreased pulmonary sounds [de-identified] : decreased ROM [de-identified] : defered [de-identified] : non palpable

## 2020-04-21 NOTE — RESULTS/DATA
[FreeTextEntry1] : 4/21/20 \par WBC - 12.5\par hgb 12.6\par hct 37.7\par plts 280\par \par 3/23/20 Ferritin - 448\par Chromagranin A - 1044 (previously 1065)

## 2020-04-21 NOTE — HEALTH RISK ASSESSMENT
[Fair] : ~his/her~ current health as fair  [Good] : ~his/her~  mood as  good [Intercurrent ED visits] : went to ED [Intercurrent hospitalizations] : was admitted to the hospital  [1] : 2) Feeling down, depressed, or hopeless for several days (1) [FreeTextEntry1] : her health [] : No [de-identified] : pending oncology [de-identified] : increased risk

## 2020-04-21 NOTE — REVIEW OF SYSTEMS
[Hearing Loss] : hearing loss [Dizziness] : dizziness [Unsteady Walking] : ataxia [Negative] : Psychiatric [Chills] : no chills [Dry Eyes] : no dryness of the eyes [Hoarseness] : no hoarseness [Vision Problems] : no vision problems [Lower Ext Edema] : no lower extremity edema [Mucosal Pain] : no mucosal pain [Vomiting] : no vomiting [Diarrhea] : no diarrhea [Muscle Pain] : no muscle pain [Skin Wound] : no skin wound [Confused] : no confusion [Difficulty Walking] : no difficulty walking [Insomnia] : no insomnia [Hot Flashes] : no hot flashes [FreeTextEntry3] : glasses [FreeTextEntry4] : MAGUI [FreeTextEntry7] : nausea [FreeTextEntry8] : nocturia [de-identified] : Numbness in right hand thumb and pointer in a.m. [FreeTextEntry9] : s/p hip repair with marcella placement [Fever] : no fever [Fatigue] : no fatigue [Night Sweats] : no night sweats [Recent Change In Weight] : ~T no recent weight change [Earache] : no earache [Nosebleed] : no nosebleeds [Nasal Discharge] : no nasal discharge [Postnasal Drip] : no postnasal drip [Chest Pain] : no chest pain [Palpitations] : no palpitations [Paroysmal Nocturnal Dyspnea] : no paroysmal nocturnal dyspnea [Cough] : no cough [Shortness Of Breath] : no shortness of breath [Diarrhea] : diarrhea [Constipation] : no constipation [Dysuria] : no dysuria [Joint Pain] : no joint pain [Itching] : no itching [Joint Swelling] : no joint swelling [Skin Rash] : no skin rash [Headache] : no headache [Anxiety] : no anxiety [Memory Loss] : no memory loss [Depression] : no depression [Easy Bruising] : no easy bruising [Easy Bleeding] : no easy bleeding [FreeTextEntry2] : Frail looking elderly patient

## 2020-05-05 ENCOUNTER — RESULT REVIEW (OUTPATIENT)
Age: 85
End: 2020-05-05

## 2020-05-07 ENCOUNTER — APPOINTMENT (OUTPATIENT)
Dept: GERIATRICS | Facility: CLINIC | Age: 85
End: 2020-05-07
Payer: MEDICARE

## 2020-05-07 VITALS — HEART RATE: 66 BPM | TEMPERATURE: 97.1 F | RESPIRATION RATE: 22 BRPM

## 2020-05-07 DIAGNOSIS — R21 RASH AND OTHER NONSPECIFIC SKIN ERUPTION: ICD-10-CM

## 2020-05-07 DIAGNOSIS — N76.2 ACUTE VULVITIS: ICD-10-CM

## 2020-05-07 RX ORDER — FLUOROURACIL 50 MG/G
5 CREAM TOPICAL
Qty: 40 | Refills: 0 | Status: DISCONTINUED | COMMUNITY
Start: 2020-02-05

## 2020-05-07 NOTE — PHYSICAL EXAM
[Vulvitis] : vulvitis [General Appearance - Alert] : alert [General Appearance - In No Acute Distress] : in no acute distress [] : no respiratory distress [General Appearance - Well Nourished] : well nourished [Respiration, Rhythm And Depth] : normal respiratory rhythm and effort [Auscultation Breath Sounds / Voice Sounds] : lungs were clear to auscultation bilaterally [Apical Impulse] : the apical impulse was normal [Heart Sounds] : normal S1 and S2 [Heart Rate And Rhythm] : heart rate was normal and rhythm regular [Papular Rash] : A papular rash was noted [FreeTextEntry1] : several large pimples/abscess on bilat vulva, no discharge, no bleeding, no redness to groin

## 2020-05-07 NOTE — HISTORY OF PRESENT ILLNESS
[FreeTextEntry1] : pt noted bleeding on her pad on 5/7\par No bleeding from vagina or anus\par she believes pad/pullup is irritating to her vulva\par has had a few low grade fevers on 5/2-5/4\par no cough, no SOB\par tested negative for covid on 5/6\par \par Pt seen in her asssited living unit due to unit on lockdown/homebound

## 2020-05-07 NOTE — ASSESSMENT
[FreeTextEntry1] : does not appear fungal will recheck next week\par Once healed, try ibrahima cream for protection and prophylaxis

## 2020-05-12 ENCOUNTER — APPOINTMENT (OUTPATIENT)
Dept: GERIATRICS | Facility: CLINIC | Age: 85
End: 2020-05-12
Payer: MEDICARE

## 2020-05-12 DIAGNOSIS — R21 RASH AND OTHER NONSPECIFIC SKIN ERUPTION: ICD-10-CM

## 2020-05-12 RX ORDER — MUPIROCIN 20 MG/G
2 OINTMENT TOPICAL
Qty: 22 | Refills: 0 | Status: DISCONTINUED | COMMUNITY
Start: 2019-09-18 | End: 2020-05-12

## 2020-05-12 RX ORDER — ASPIRIN 81 MG
6.5 TABLET, DELAYED RELEASE (ENTERIC COATED) ORAL TWICE DAILY
Qty: 1 | Refills: 0 | Status: DISCONTINUED | COMMUNITY
Start: 2019-03-26 | End: 2020-05-12

## 2020-05-12 RX ORDER — LACTULOSE 10 G/15ML
10 SOLUTION ORAL; RECTAL DAILY
Qty: 1 | Refills: 2 | Status: DISCONTINUED | COMMUNITY
Start: 2019-04-22 | End: 2020-05-12

## 2020-05-12 RX ORDER — LACTULOSE 10 G/15ML
10 SOLUTION ORAL; RECTAL DAILY
Qty: 450 | Refills: 5 | Status: DISCONTINUED | COMMUNITY
Start: 2019-04-19 | End: 2020-05-12

## 2020-05-12 RX ORDER — LACTULOSE 10 G/15ML
10 SOLUTION ORAL
Qty: 473 | Refills: 0 | Status: DISCONTINUED | COMMUNITY
Start: 2019-12-27 | End: 2020-05-12

## 2020-05-12 RX ORDER — LACTULOSE 10 G/15ML
10 SOLUTION ORAL; RECTAL TWICE DAILY
Qty: 1 | Refills: 0 | Status: DISCONTINUED | COMMUNITY
Start: 2017-07-05 | End: 2020-05-12

## 2020-05-12 RX ORDER — MELATONIN 3 MG
25 MCG TABLET ORAL
Qty: 30 | Refills: 5 | Status: DISCONTINUED | COMMUNITY
End: 2020-05-12

## 2020-05-12 RX ORDER — POLYETHYLENE GLYCOL 400 AND PROPYLENE GLYCOL 4; 3 MG/ML; MG/ML
0.4-0.3 SOLUTION/ DROPS OPHTHALMIC TWICE DAILY
Qty: 1 | Refills: 3 | Status: DISCONTINUED | COMMUNITY
Start: 2019-03-15 | End: 2020-05-12

## 2020-05-12 RX ORDER — DOCUSATE SODIUM 100 MG/1
100 CAPSULE ORAL 3 TIMES DAILY
Refills: 0 | Status: DISCONTINUED | COMMUNITY
End: 2020-05-12

## 2020-05-12 RX ORDER — BACILLUS COAGULANS/INULIN 1B-250 MG
CAPSULE ORAL
Refills: 0 | Status: DISCONTINUED | COMMUNITY
End: 2020-05-12

## 2020-05-12 RX ORDER — MUPIROCIN 20 MG/G
2 OINTMENT TOPICAL 3 TIMES DAILY
Qty: 1 | Refills: 1 | Status: DISCONTINUED | COMMUNITY
Start: 2020-05-07 | End: 2020-05-12

## 2020-05-12 RX ORDER — LACTOBACILLUS ACIDOPHILUS
CAPSULE ORAL
Qty: 90 | Refills: 10 | Status: DISCONTINUED | COMMUNITY
Start: 2020-02-14 | End: 2020-05-12

## 2020-05-12 RX ORDER — ASPIRIN 81 MG/1
81 TABLET ORAL DAILY
Refills: 0 | Status: DISCONTINUED | COMMUNITY
Start: 2019-03-28 | End: 2020-05-12

## 2020-05-12 NOTE — HISTORY OF PRESENT ILLNESS
[FreeTextEntry1] : pt noted bleeding on her pad on 5/7\par No bleeding or discharge from vagina or anus\par has had a few low grade fevers ~99 on 5/2-5/4, nl temp since then\par no cough, no SOB, no fatigue, no sore throat \par tested negative for covid on 5/6\par Has had treatment of bactroban bid to vulva from 5/8-5/12\par continues to have discomfort when sitting and does not feel \par better.\par \par Pt seen in her assisted living unit.

## 2020-05-12 NOTE — PHYSICAL EXAM
[General Appearance - Alert] : alert [General Appearance - In No Acute Distress] : in no acute distress [Sclera] : the sclera and conjunctiva were normal [PERRL With Normal Accommodation] : pupils were equal in size, round, and reactive to light [] : no respiratory distress [Respiration, Rhythm And Depth] : normal respiratory rhythm and effort [Heart Rate And Rhythm] : heart rate was normal and rhythm regular [Apical Impulse] : the apical impulse was normal [Vulvitis] : vulvitis [FreeTextEntry1] : scalp lesions - currently being treated by Dr. Bhakta

## 2020-05-12 NOTE — REVIEW OF SYSTEMS
[As Noted in HPI] : as noted in HPI [Skin Lesions] : skin lesion [Negative] : Endocrine [de-identified] : scalp lesions

## 2020-05-12 NOTE — ASSESSMENT
[FreeTextEntry1] : I examined pt in her assited living apt due to covid precautions and\par vulval exam needed bed for safety to exam\par \par No change in pt's symptoms or the presentation of the growths.\par Will d/c bactroban  \par Informed nurse who discussed with Dr. Jackson (pt seen monthly and has appt on 5/19) \par These symptoms not related to her ca treatment\par \par Called Dr. Mendelowitz office who will see pt on 5/14 at 1pm\par Informed them pt has  tested neg for covid19; however is on\par assisted living unit which does have 2 covid pos pts and\par should take krunal precautions when being seen\par

## 2020-05-14 ENCOUNTER — APPOINTMENT (OUTPATIENT)
Dept: OBGYN | Facility: CLINIC | Age: 85
End: 2020-05-14
Payer: MEDICARE

## 2020-05-14 VITALS
HEIGHT: 62 IN | BODY MASS INDEX: 25.76 KG/M2 | SYSTOLIC BLOOD PRESSURE: 142 MMHG | WEIGHT: 140 LBS | DIASTOLIC BLOOD PRESSURE: 78 MMHG | TEMPERATURE: 98.6 F

## 2020-05-14 DIAGNOSIS — N90.89 OTHER SPECIFIED NONINFLAMMATORY DISORDERS OF VULVA AND PERINEUM: ICD-10-CM

## 2020-05-14 DIAGNOSIS — Z80.3 FAMILY HISTORY OF MALIGNANT NEOPLASM OF BREAST: ICD-10-CM

## 2020-05-14 PROCEDURE — 99203 OFFICE O/P NEW LOW 30 MIN: CPT

## 2020-05-18 ENCOUNTER — RX RENEWAL (OUTPATIENT)
Age: 85
End: 2020-05-18

## 2020-05-19 ENCOUNTER — APPOINTMENT (OUTPATIENT)
Dept: HEMATOLOGY ONCOLOGY | Facility: CLINIC | Age: 85
End: 2020-05-19
Payer: MEDICARE

## 2020-05-19 ENCOUNTER — RESULT REVIEW (OUTPATIENT)
Age: 85
End: 2020-05-19

## 2020-05-19 VITALS
TEMPERATURE: 98.3 F | SYSTOLIC BLOOD PRESSURE: 181 MMHG | RESPIRATION RATE: 18 BRPM | WEIGHT: 140 LBS | BODY MASS INDEX: 25.76 KG/M2 | DIASTOLIC BLOOD PRESSURE: 67 MMHG | HEART RATE: 62 BPM | HEIGHT: 62 IN | OXYGEN SATURATION: 98 %

## 2020-05-19 PROCEDURE — 99214 OFFICE O/P EST MOD 30 MIN: CPT

## 2020-05-19 NOTE — PLAN
[FreeTextEntry1] : The patient is alert, aware of her medical problems\par pending oncology evaluation with dr Landry at [place]\par The patient educated about fall prevention\par

## 2020-05-19 NOTE — PHYSICAL EXAM
[Ambulatory and capable of all self care but unable to carry out any work activities] : Status 2- Ambulatory and capable of all self care but unable to carry out any work activities. Up and about more than 50% of waking hours [Normal] : grossly intact [No Acute Distress] : no acute distress [No Respiratory Distress] : no respiratory distress  [Normal Sclera/Conjunctiva] : normal sclera/conjunctiva [Normal Outer Ear/Nose] : the outer ears and nose were normal in appearance [Normal Rate] : normal rate  [Clear to Auscultation] : lungs were clear to auscultation bilaterally [Memory Grossly Normal] : memory grossly normal [de-identified] : Crusted, large scalp lesions., resolved. [de-identified] : Frail appearing [de-identified] : decreased pulmonary sounds [de-identified] : decreased ROM [de-identified] : non palpable [de-identified] : defered

## 2020-05-19 NOTE — REVIEW OF SYSTEMS
[Hearing Loss] : hearing loss [Dizziness] : dizziness [Unsteady Walking] : ataxia [Negative] : Heme/Lymph [Chills] : no chills [Dry Eyes] : no dryness of the eyes [Vision Problems] : no vision problems [Hoarseness] : no hoarseness [Mucosal Pain] : no mucosal pain [Lower Ext Edema] : no lower extremity edema [Vomiting] : no vomiting [Diarrhea] : no diarrhea [Muscle Pain] : no muscle pain [Skin Wound] : no skin wound [Confused] : no confusion [Difficulty Walking] : no difficulty walking [Insomnia] : no insomnia [Hot Flashes] : no hot flashes [FreeTextEntry3] : glasses [FreeTextEntry4] : MAGUI [FreeTextEntry7] : nausea [FreeTextEntry8] : nocturia [de-identified] : Numbness in right hand thumb and pointer in a.m. [FreeTextEntry9] : s/p hip repair with marcella placement [Fever] : no fever [Fatigue] : no fatigue [Night Sweats] : no night sweats [Recent Change In Weight] : ~T no recent weight change [Earache] : no earache [Nosebleed] : no nosebleeds [Nasal Discharge] : no nasal discharge [Postnasal Drip] : no postnasal drip [Chest Pain] : no chest pain [Palpitations] : no palpitations [Paroysmal Nocturnal Dyspnea] : no paroysmal nocturnal dyspnea [Shortness Of Breath] : no shortness of breath [Cough] : no cough [Diarrhea] : diarrhea [Constipation] : no constipation [Dysuria] : no dysuria [Joint Pain] : no joint pain [Skin Rash] : no skin rash [Itching] : no itching [Joint Swelling] : no joint swelling [Memory Loss] : no memory loss [Headache] : no headache [Anxiety] : no anxiety [Depression] : no depression [Easy Bruising] : no easy bruising [Easy Bleeding] : no easy bleeding [FreeTextEntry2] : Frail looking elderly patient

## 2020-05-19 NOTE — HISTORY OF PRESENT ILLNESS
[Therapy: ___] : Therapy: [unfilled] [1 - Distress Level] : Distress Level: 1 [de-identified] : Patient presents today for PNET and anemia, accompanied by her daughter, stating she's doing very well. PT has some mild fatigue but nothing more than her usual self. Denies n/v, fever, headaches, dizziness, chest pain/palpitation, rash, bleeding,  or GI symptoms, SOB/JOHNSON.\par Balance -good, ambulates with walker \par Cyst on labia \par [place] [FreeTextEntry1] : New patient, multiple medical problems\par  [de-identified] : Patient is an 89 year old new consult for newly diagnosed PNET Pt.had a h/o abd pain and underwent an abdominal u/s on 04/25/2017.Impression showed patient was post cholecystectomy. There is dilation of the extrahepatic duct. Mild intrahepatic ductal dilation is also appreciated. Further evaluation recommended. Pt. then underwent an MRI of abd w/ w/o contrast that showed moderate intra and extrahepatic biliary ductal dilation and choledocholithiasis at least 3 common bile duct stones as described. A 2.3 x 2.3 uncinate process pancreatic mass with imaging features favoring a pancreatic neuroendocrine tumor rather than adenocarcinoma. Patient underwent a fine needle aspiration for cytology of the pancreatic uncinate process mass that showed well differentiated pancreatic neuroendocrine tumor. Immunohistochemical stains for chromogranin, synaptophysin and Ki-67 wer eperformed on the cell block. Chromogranin and synaphysin are positive and Ki-67 shows less that 3% staining. \par \par

## 2020-05-19 NOTE — HEALTH RISK ASSESSMENT
[Fair] : ~his/her~ current health as fair  [Good] : ~his/her~  mood as  good [Intercurrent hospitalizations] : was admitted to the hospital  [Intercurrent ED visits] : went to ED [1] : 2) Feeling down, depressed, or hopeless for several days (1) [FreeTextEntry1] : her health [de-identified] : pending oncology [] : No [de-identified] : increased risk

## 2020-05-19 NOTE — ASSESSMENT
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 \par Ferritn 484\par \par Vitamin B12 769 continue oral B12. 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 \par \par Monitor CBC, Chem Profile, Chromogranin\par \par \par \par

## 2020-05-27 RX ORDER — LIDOCAINE AND PRILOCAINE 25; 25 MG/G; MG/G
2.5-2.5 CREAM TOPICAL
Qty: 1 | Refills: 0 | Status: ACTIVE | COMMUNITY
Start: 2020-05-27

## 2020-05-29 ENCOUNTER — RX RENEWAL (OUTPATIENT)
Age: 85
End: 2020-05-29

## 2020-05-29 RX ORDER — ASPIRIN 81 MG/1
81 TABLET, COATED ORAL
Qty: 28 | Refills: 10 | Status: ACTIVE | COMMUNITY
Start: 2020-05-29 | End: 1900-01-01

## 2020-06-05 ENCOUNTER — RX RENEWAL (OUTPATIENT)
Age: 85
End: 2020-06-05

## 2020-06-07 ENCOUNTER — RX RENEWAL (OUTPATIENT)
Age: 85
End: 2020-06-07

## 2020-06-07 RX ORDER — DM/PE/ACETAMINOPHEN/CHLORPHENR 10-5-325-2
25 MCG TABLET, SEQUENTIAL ORAL DAILY
Qty: 30 | Refills: 11 | Status: ACTIVE | COMMUNITY
Start: 2020-01-23 | End: 1900-01-01

## 2020-06-16 ENCOUNTER — APPOINTMENT (OUTPATIENT)
Dept: HEMATOLOGY ONCOLOGY | Facility: CLINIC | Age: 85
End: 2020-06-16
Payer: MEDICARE

## 2020-06-16 ENCOUNTER — RESULT REVIEW (OUTPATIENT)
Age: 85
End: 2020-06-16

## 2020-06-16 VITALS
HEIGHT: 62 IN | DIASTOLIC BLOOD PRESSURE: 75 MMHG | WEIGHT: 138.5 LBS | SYSTOLIC BLOOD PRESSURE: 172 MMHG | OXYGEN SATURATION: 97 % | BODY MASS INDEX: 25.49 KG/M2 | HEART RATE: 70 BPM | TEMPERATURE: 98.8 F | RESPIRATION RATE: 19 BRPM

## 2020-06-16 DIAGNOSIS — G47.00 INSOMNIA, UNSPECIFIED: ICD-10-CM

## 2020-06-16 PROCEDURE — 99214 OFFICE O/P EST MOD 30 MIN: CPT

## 2020-06-16 NOTE — REVIEW OF SYSTEMS
[Hearing Loss] : hearing loss [Dizziness] : dizziness [Unsteady Walking] : ataxia [Negative] : Heme/Lymph [Chills] : no chills [Hoarseness] : no hoarseness [Vision Problems] : no vision problems [Dry Eyes] : no dryness of the eyes [Mucosal Pain] : no mucosal pain [Lower Ext Edema] : no lower extremity edema [Diarrhea] : no diarrhea [Vomiting] : no vomiting [Muscle Pain] : no muscle pain [Confused] : no confusion [Skin Wound] : no skin wound [Difficulty Walking] : no difficulty walking [Insomnia] : no insomnia [Hot Flashes] : no hot flashes [FreeTextEntry3] : glasses [FreeTextEntry4] : MAGUI [FreeTextEntry7] : nausea [FreeTextEntry8] : nocturia [FreeTextEntry9] : s/p hip repair with marcella placement [de-identified] : Numbness in right hand thumb and pointer in a.m. [Fever] : no fever [Fatigue] : no fatigue [Recent Change In Weight] : ~T no recent weight change [Night Sweats] : no night sweats [Earache] : no earache [Nosebleed] : no nosebleeds [Nasal Discharge] : no nasal discharge [Chest Pain] : no chest pain [Postnasal Drip] : no postnasal drip [Palpitations] : no palpitations [Paroysmal Nocturnal Dyspnea] : no paroysmal nocturnal dyspnea [Constipation] : no constipation [Cough] : no cough [Shortness Of Breath] : no shortness of breath [Dysuria] : no dysuria [Diarrhea] : diarrhea [Joint Pain] : no joint pain [Itching] : no itching [Joint Swelling] : no joint swelling [Skin Rash] : no skin rash [Headache] : no headache [Memory Loss] : no memory loss [Anxiety] : no anxiety [Depression] : no depression [Easy Bleeding] : no easy bleeding [Easy Bruising] : no easy bruising [FreeTextEntry2] : Frail looking elderly patient

## 2020-06-16 NOTE — HEALTH RISK ASSESSMENT
[Fair] : ~his/her~ current health as fair  [Good] : ~his/her~  mood as  good [Intercurrent hospitalizations] : was admitted to the hospital  [Intercurrent ED visits] : went to ED [1] : 2) Feeling down, depressed, or hopeless for several days (1) [FreeTextEntry1] : her health [de-identified] : increased risk [de-identified] : pending oncology [] : No

## 2020-06-16 NOTE — PHYSICAL EXAM
[Ambulatory and capable of all self care but unable to carry out any work activities] : Status 2- Ambulatory and capable of all self care but unable to carry out any work activities. Up and about more than 50% of waking hours [Normal] : grossly intact [Normal Sclera/Conjunctiva] : normal sclera/conjunctiva [Normal Outer Ear/Nose] : the outer ears and nose were normal in appearance [No Acute Distress] : no acute distress [Normal Rate] : normal rate  [Clear to Auscultation] : lungs were clear to auscultation bilaterally [No Respiratory Distress] : no respiratory distress  [Memory Grossly Normal] : memory grossly normal [de-identified] : Crusted, large scalp lesions., resolved. [de-identified] : decreased ROM [de-identified] : Frail appearing [de-identified] : non palpable [de-identified] : defered [de-identified] : decreased pulmonary sounds

## 2020-06-16 NOTE — HISTORY OF PRESENT ILLNESS
[Therapy: ___] : Therapy: [unfilled] [1 - Distress Level] : Distress Level: 1 [de-identified] : Patient is an 89 year old new consult for newly diagnosed PNET Pt.had a h/o abd pain and underwent an abdominal u/s on 04/25/2017.Impression showed patient was post cholecystectomy. There is dilation of the extrahepatic duct. Mild intrahepatic ductal dilation is also appreciated. Further evaluation recommended. Pt. then underwent an MRI of abd w/ w/o contrast that showed moderate intra and extrahepatic biliary ductal dilation and choledocholithiasis at least 3 common bile duct stones as described. A 2.3 x 2.3 uncinate process pancreatic mass with imaging features favoring a pancreatic neuroendocrine tumor rather than adenocarcinoma. Patient underwent a fine needle aspiration for cytology of the pancreatic uncinate process mass that showed well differentiated pancreatic neuroendocrine tumor. Immunohistochemical stains for chromogranin, synaptophysin and Ki-67 wer eperformed on the cell block. Chromogranin and synaphysin are positive and Ki-67 shows less that 3% staining. \par \par  [de-identified] : Patient presents today for PNET and anemia, accompanied by her daughter, stating she's doing very well. PT has some mild fatigue but nothing more than her usual self. Denies n/v, fever, headaches, dizziness, chest pain/palpitation, rash, bleeding,  or GI symptoms, SOB/JOHNSON.\par Balance -good, ambulates with walker \par Cyst on labia \par [place] [FreeTextEntry1] : New patient, multiple medical problems\par

## 2020-06-16 NOTE — PLAN
[FreeTextEntry1] : The patient is alert, aware of her medical problems\par pending oncology evaluation with dr Landry at [place]\par The patient educated about fall prevention\par

## 2020-06-16 NOTE — ASSESSMENT
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 \par Ferritn 406\par \par Vitamin B12 769 continue oral B12. 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 Bhakta\par - consider Libtayo\par \par Elevated BP- discuss with PCP, intermittent.\par \par Office visit in 4  weeks or prn for new or worsening symptoms. \par \par Monitor CBC, Chem Profile, Chromogranin\par \par \par \par

## 2020-07-14 ENCOUNTER — RESULT REVIEW (OUTPATIENT)
Age: 85
End: 2020-07-14

## 2020-07-14 ENCOUNTER — APPOINTMENT (OUTPATIENT)
Dept: HEMATOLOGY ONCOLOGY | Facility: CLINIC | Age: 85
End: 2020-07-14
Payer: MEDICARE

## 2020-07-14 VITALS
WEIGHT: 139.6 LBS | DIASTOLIC BLOOD PRESSURE: 74 MMHG | BODY MASS INDEX: 25.69 KG/M2 | OXYGEN SATURATION: 97 % | HEART RATE: 70 BPM | RESPIRATION RATE: 18 BRPM | TEMPERATURE: 98.5 F | SYSTOLIC BLOOD PRESSURE: 174 MMHG | HEIGHT: 62 IN

## 2020-07-14 DIAGNOSIS — R26.81 UNSTEADINESS ON FEET: ICD-10-CM

## 2020-07-14 PROCEDURE — 99214 OFFICE O/P EST MOD 30 MIN: CPT

## 2020-07-14 NOTE — PHYSICAL EXAM
[Ambulatory and capable of all self care but unable to carry out any work activities] : Status 2- Ambulatory and capable of all self care but unable to carry out any work activities. Up and about more than 50% of waking hours [Normal] : affect appropriate [No Acute Distress] : no acute distress [Normal Outer Ear/Nose] : the outer ears and nose were normal in appearance [Normal Sclera/Conjunctiva] : normal sclera/conjunctiva [No Respiratory Distress] : no respiratory distress  [Normal Rate] : normal rate  [Clear to Auscultation] : lungs were clear to auscultation bilaterally [Memory Grossly Normal] : memory grossly normal [de-identified] : Frail appearing [de-identified] : Crusted, large scalp lesions, with reddened skin [de-identified] : decreased ROM [de-identified] : decreased pulmonary sounds [de-identified] : defered [de-identified] : non palpable

## 2020-07-14 NOTE — REVIEW OF SYSTEMS
[Hearing Loss] : hearing loss [Dizziness] : dizziness [Unsteady Walking] : ataxia [Negative] : Heme/Lymph [Chills] : no chills [Hoarseness] : no hoarseness [Vision Problems] : no vision problems [Dry Eyes] : no dryness of the eyes [Lower Ext Edema] : lower extremity edema [Mucosal Pain] : no mucosal pain [Vomiting] : no vomiting [Diarrhea] : no diarrhea [Muscle Pain] : no muscle pain [Skin Wound] : skin wound [Confused] : no confusion [Difficulty Walking] : difficulty walking [Insomnia] : no insomnia [Hot Flashes] : no hot flashes [FreeTextEntry3] : glasses [FreeTextEntry4] : MAGUI [FreeTextEntry8] : nocturia [FreeTextEntry5] : stable [de-identified] : uses walker [FreeTextEntry9] : s/p hip repair with marcella placement [de-identified] : scalp healing from recent scraping  [Fever] : no fever [Fatigue] : no fatigue [Recent Change In Weight] : ~T no recent weight change [Night Sweats] : no night sweats [Nosebleed] : no nosebleeds [Earache] : no earache [Nasal Discharge] : no nasal discharge [Postnasal Drip] : no postnasal drip [Chest Pain] : no chest pain [Paroysmal Nocturnal Dyspnea] : no paroysmal nocturnal dyspnea [Palpitations] : no palpitations [Shortness Of Breath] : no shortness of breath [Cough] : no cough [Constipation] : no constipation [Diarrhea] : diarrhea [Dysuria] : no dysuria [Joint Pain] : no joint pain [Skin Rash] : no skin rash [Joint Swelling] : no joint swelling [Itching] : no itching [Headache] : no headache [Depression] : no depression [Memory Loss] : no memory loss [Anxiety] : no anxiety [FreeTextEntry2] : Frail looking elderly patient [Easy Bleeding] : no easy bleeding [Easy Bruising] : no easy bruising

## 2020-07-14 NOTE — HEALTH RISK ASSESSMENT
[Fair] : ~his/her~ current health as fair  [Good] : ~his/her~  mood as  good [Intercurrent hospitalizations] : was admitted to the hospital  [Intercurrent ED visits] : went to ED [1] : 1) Little interest or pleasure doing things for several days (1) [FreeTextEntry1] : her health [de-identified] : pending oncology [] : No [de-identified] : increased risk

## 2020-07-14 NOTE — HISTORY OF PRESENT ILLNESS
[Therapy: ___] : Therapy: [unfilled] [1 - Distress Level] : Distress Level: 1 [de-identified] : Patient presents today for PNET and anemia, accompanied by her long time friend.  She reports feeling well, but has a lot of pain to her scalp since her last scraping treatment by Dr. Bhakta. She has been taking tylenol to alleviate symptoms.  Denies n/v, fever, headaches, dizziness, chest pain/palpitation, rash, bleeding,  or GI symptoms, SOB/JOHNSON. [FreeTextEntry1] : New patient, multiple medical problems\par  [de-identified] : Patient is an 89 year old new consult for newly diagnosed PNET Pt.had a h/o abd pain and underwent an abdominal u/s on 04/25/2017.Impression showed patient was post cholecystectomy. There is dilation of the extrahepatic duct. Mild intrahepatic ductal dilation is also appreciated. Further evaluation recommended. Pt. then underwent an MRI of abd w/ w/o contrast that showed moderate intra and extrahepatic biliary ductal dilation and choledocholithiasis at least 3 common bile duct stones as described. A 2.3 x 2.3 uncinate process pancreatic mass with imaging features favoring a pancreatic neuroendocrine tumor rather than adenocarcinoma. Patient underwent a fine needle aspiration for cytology of the pancreatic uncinate process mass that showed well differentiated pancreatic neuroendocrine tumor. Immunohistochemical stains for chromogranin, synaptophysin and Ki-67 wer eperformed on the cell block. Chromogranin and synaphysin are positive and Ki-67 shows less that 3% staining. \par \par

## 2020-07-14 NOTE — ASSESSMENT
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 \par Ferritn 406\par \par Vitamin B12 769 continue oral B12. 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 \par \par Monitor CBC, Chem Profile, Chromogranin

## 2020-08-15 ENCOUNTER — RESULT REVIEW (OUTPATIENT)
Age: 85
End: 2020-08-15

## 2020-08-18 ENCOUNTER — APPOINTMENT (OUTPATIENT)
Dept: HEMATOLOGY ONCOLOGY | Facility: CLINIC | Age: 85
End: 2020-08-18
Payer: MEDICARE

## 2020-08-18 ENCOUNTER — RESULT REVIEW (OUTPATIENT)
Age: 85
End: 2020-08-18

## 2020-08-18 VITALS
BODY MASS INDEX: 25.76 KG/M2 | WEIGHT: 140 LBS | HEIGHT: 62 IN | SYSTOLIC BLOOD PRESSURE: 179 MMHG | HEART RATE: 61 BPM | RESPIRATION RATE: 18 BRPM | DIASTOLIC BLOOD PRESSURE: 65 MMHG | TEMPERATURE: 98.2 F | OXYGEN SATURATION: 98 %

## 2020-08-18 DIAGNOSIS — E87.1 HYPO-OSMOLALITY AND HYPONATREMIA: ICD-10-CM

## 2020-08-18 DIAGNOSIS — K21.9 GASTRO-ESOPHAGEAL REFLUX DISEASE W/OUT ESOPHAGITIS: ICD-10-CM

## 2020-08-18 PROCEDURE — 99214 OFFICE O/P EST MOD 30 MIN: CPT

## 2020-08-18 NOTE — HEALTH RISK ASSESSMENT
[Fair] : ~his/her~ current health as fair  [Good] : ~his/her~  mood as  good [Intercurrent hospitalizations] : was admitted to the hospital  [Intercurrent ED visits] : went to ED [1] : 1) Little interest or pleasure doing things for several days (1) [FreeTextEntry1] : her health [] : No [de-identified] : pending oncology [de-identified] : increased risk

## 2020-08-18 NOTE — PHYSICAL EXAM
[Ambulatory and capable of all self care but unable to carry out any work activities] : Status 2- Ambulatory and capable of all self care but unable to carry out any work activities. Up and about more than 50% of waking hours [Normal] : affect appropriate [No Acute Distress] : no acute distress [Normal Sclera/Conjunctiva] : normal sclera/conjunctiva [Normal Outer Ear/Nose] : the outer ears and nose were normal in appearance [Clear to Auscultation] : lungs were clear to auscultation bilaterally [No Respiratory Distress] : no respiratory distress  [Memory Grossly Normal] : memory grossly normal [Normal Rate] : normal rate  [de-identified] : walks with walker [de-identified] : lesions to scalp covered by dressing [de-identified] : Frail appearing [de-identified] : decreased ROM [de-identified] : decreased pulmonary sounds [de-identified] : defered [de-identified] : non palpable

## 2020-08-18 NOTE — REASON FOR VISIT
[Follow-Up Visit] : a follow-up [Family Member] : family member [FreeTextEntry2] : PNET, Anemia, constipation

## 2020-08-18 NOTE — HISTORY OF PRESENT ILLNESS
[Therapy: ___] : Therapy: [unfilled] [1 - Distress Level] : Distress Level: 1 [FreeTextEntry1] : New patient, multiple medical problems\par  [de-identified] : Patient presents today for PNET and anemia, accompanied by her daughter.  She reports feeling well, but continues to have pain to her scalp since her last scraping treatment by Dr. Bhakta ~2 weeks ago. She has been taking tylenol to alleviate symptoms.  She denies n/v, fever, headaches, dizziness, chest pain/palpitation, rash, bleeding,  or GI symptoms, SOB/JOHNSON. [de-identified] : Patient is an 89 year old new consult for newly diagnosed PNET Pt.had a h/o abd pain and underwent an abdominal u/s on 04/25/2017.Impression showed patient was post cholecystectomy. There is dilation of the extrahepatic duct. Mild intrahepatic ductal dilation is also appreciated. Further evaluation recommended. Pt. then underwent an MRI of abd w/ w/o contrast that showed moderate intra and extrahepatic biliary ductal dilation and choledocholithiasis at least 3 common bile duct stones as described. A 2.3 x 2.3 uncinate process pancreatic mass with imaging features favoring a pancreatic neuroendocrine tumor rather than adenocarcinoma. Patient underwent a fine needle aspiration for cytology of the pancreatic uncinate process mass that showed well differentiated pancreatic neuroendocrine tumor. Immunohistochemical stains for chromogranin, synaptophysin and Ki-67 wer eperformed on the cell block. Chromogranin and synaphysin are positive and Ki-67 shows less that 3% staining. \par \par

## 2020-08-18 NOTE — REVIEW OF SYSTEMS
[Skin Wound] : skin wound [Difficulty Walking] : difficulty walking [Hearing Loss] : hearing loss [Lower Ext Edema] : lower extremity edema [Dizziness] : dizziness [Unsteady Walking] : ataxia [Negative] : Heme/Lymph [Chills] : no chills [Dry Eyes] : no dryness of the eyes [Vision Problems] : no vision problems [Hoarseness] : no hoarseness [Mucosal Pain] : no mucosal pain [Vomiting] : no vomiting [Diarrhea] : no diarrhea [Muscle Pain] : no muscle pain [Insomnia] : no insomnia [Confused] : no confusion [Hot Flashes] : no hot flashes [FreeTextEntry8] : nocturia [FreeTextEntry5] : stable [FreeTextEntry4] : MAGUI [FreeTextEntry9] : s/p hip repair with marcella placement [de-identified] : uses walker [de-identified] : scalp healing from recent scraping  [Fever] : no fever [Fatigue] : no fatigue [Night Sweats] : no night sweats [Recent Change In Weight] : ~T no recent weight change [Earache] : no earache [Nosebleed] : no nosebleeds [Nasal Discharge] : no nasal discharge [Postnasal Drip] : no postnasal drip [Palpitations] : no palpitations [Chest Pain] : no chest pain [Shortness Of Breath] : no shortness of breath [Paroysmal Nocturnal Dyspnea] : no paroysmal nocturnal dyspnea [Cough] : no cough [Constipation] : no constipation [Diarrhea] : diarrhea [Joint Pain] : no joint pain [Dysuria] : no dysuria [Joint Swelling] : no joint swelling [Skin Rash] : no skin rash [Itching] : no itching [Memory Loss] : no memory loss [Anxiety] : no anxiety [Headache] : no headache [Easy Bleeding] : no easy bleeding [Depression] : no depression [Easy Bruising] : no easy bruising [FreeTextEntry2] : Frail looking elderly patient

## 2020-08-18 NOTE — ASSESSMENT
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 \par Ferritn 406\par \par Vitamin B12 769 continue oral B12. \par \par Osteoporosis-  Reclast 5mg yearly, last dose 8/2020.\par \par Patient in assisted living after recent hip fx/ ORIF\par \par SCC of the scalp\par - recurrent lesions\par - followed by -\par - consider Libtayo\par \par Elevated BP- discuss with PCP, intermittent.\par \par Hyponatremia- told to increase salt intake\par \par Case and management discussed with Dr. Jackson\par Office visit in 4  weeks or prn for new or worsening symptoms. \par Monitor CBC, Chem Profile, Chromogranin

## 2020-08-18 NOTE — PLAN
[FreeTextEntry1] : The patient is alert, aware of her medical problems\par pending oncology evaluation with dr Landry at [place]\par The patient educated about fall prevention\par

## 2020-08-27 ENCOUNTER — APPOINTMENT (OUTPATIENT)
Dept: GERIATRICS | Facility: CLINIC | Age: 85
End: 2020-08-27
Payer: MEDICARE

## 2020-08-27 VITALS
TEMPERATURE: 97 F | RESPIRATION RATE: 18 BRPM | SYSTOLIC BLOOD PRESSURE: 150 MMHG | HEART RATE: 66 BPM | DIASTOLIC BLOOD PRESSURE: 60 MMHG

## 2020-08-27 DIAGNOSIS — R23.4 CHANGES IN SKIN TEXTURE: ICD-10-CM

## 2020-08-27 NOTE — HISTORY OF PRESENT ILLNESS
[FreeTextEntry1] : pt has had skin tag on tip of nose \par active bleeding noted yesterday when pt\par washed her face

## 2020-08-27 NOTE — PHYSICAL EXAM
[General Appearance - Alert] : alert [General Appearance - In No Acute Distress] : in no acute distress [PERRL With Normal Accommodation] : pupils were equal in size, round, and reactive to light [Sclera] : the sclera and conjunctiva were normal [] : no respiratory distress [Respiration, Rhythm And Depth] : normal respiratory rhythm and effort [Apical Impulse] : the apical impulse was normal [Heart Rate And Rhythm] : heart rate was normal and rhythm regular [FreeTextEntry1] : small skin tear on tip of nares which has partially detached and dried blood on skin tag. Another skin tag on right cheek. continues with redness on top of skull, dx as squamous cell lesions

## 2020-08-27 NOTE — ASSESSMENT
[FreeTextEntry1] : For skin tag on nares, please wash with NS, cover with\par small allevyn q3 days \par f/u with dermatology if it doesn't fall off on its own in 2 weeks\par Continue regular f/u visits for squamous on top of head\par

## 2020-09-22 ENCOUNTER — APPOINTMENT (OUTPATIENT)
Dept: HEMATOLOGY ONCOLOGY | Facility: CLINIC | Age: 85
End: 2020-09-22
Payer: MEDICARE

## 2020-09-29 ENCOUNTER — RESULT REVIEW (OUTPATIENT)
Age: 85
End: 2020-09-29

## 2020-09-29 ENCOUNTER — APPOINTMENT (OUTPATIENT)
Dept: HEMATOLOGY ONCOLOGY | Facility: CLINIC | Age: 85
End: 2020-09-29
Payer: MEDICARE

## 2020-09-29 VITALS
TEMPERATURE: 98.1 F | OXYGEN SATURATION: 98 % | WEIGHT: 138 LBS | RESPIRATION RATE: 20 BRPM | HEIGHT: 62 IN | BODY MASS INDEX: 25.4 KG/M2 | HEART RATE: 66 BPM | DIASTOLIC BLOOD PRESSURE: 67 MMHG | SYSTOLIC BLOOD PRESSURE: 166 MMHG

## 2020-09-29 DIAGNOSIS — D63.1 CHRONIC KIDNEY DISEASE, STAGE 3 (MODERATE): ICD-10-CM

## 2020-09-29 DIAGNOSIS — C44.42 SQUAMOUS CELL CARCINOMA OF SKIN OF SCALP AND NECK: ICD-10-CM

## 2020-09-29 DIAGNOSIS — N18.3 CHRONIC KIDNEY DISEASE, STAGE 3 (MODERATE): ICD-10-CM

## 2020-09-29 DIAGNOSIS — E83.52 HYPERCALCEMIA: ICD-10-CM

## 2020-09-29 PROCEDURE — 99214 OFFICE O/P EST MOD 30 MIN: CPT

## 2020-09-29 NOTE — REVIEW OF SYSTEMS
[Chills] : no chills [Dry Eyes] : no dryness of the eyes [Vision Problems] : no vision problems [Hoarseness] : no hoarseness [Mucosal Pain] : no mucosal pain [Vomiting] : no vomiting [Diarrhea] : no diarrhea [Muscle Pain] : no muscle pain [Confused] : no confusion [Insomnia] : no insomnia [Hot Flashes] : no hot flashes [FreeTextEntry4] : MAGUI [FreeTextEntry5] : stable [FreeTextEntry8] : nocturia [FreeTextEntry9] : s/p hip repair with marcella placement [de-identified] : scalp healing from recent scraping  [de-identified] : uses walker [Fever] : no fever [Fatigue] : no fatigue [Night Sweats] : no night sweats [Recent Change In Weight] : ~T no recent weight change [Earache] : no earache [Nosebleed] : no nosebleeds [Nasal Discharge] : no nasal discharge [Postnasal Drip] : no postnasal drip [Chest Pain] : no chest pain [Palpitations] : no palpitations [Paroysmal Nocturnal Dyspnea] : no paroysmal nocturnal dyspnea [Shortness Of Breath] : no shortness of breath [Cough] : no cough [Constipation] : no constipation [Diarrhea] : diarrhea [Dysuria] : no dysuria [Joint Pain] : no joint pain [Joint Swelling] : no joint swelling [Itching] : no itching [Skin Rash] : no skin rash [Headache] : no headache [Memory Loss] : no memory loss [Anxiety] : no anxiety [Depression] : no depression [Easy Bleeding] : no easy bleeding [Easy Bruising] : no easy bruising [FreeTextEntry2] : Frail looking elderly patient

## 2020-09-29 NOTE — PLAN
[FreeTextEntry1] : The patient is alert, aware of her medical problems\par pending oncology evaluation with dr Landry at [place]\par The patient educated about fall prevention\par

## 2020-09-29 NOTE — ASSESSMENT
[FreeTextEntry1] : 89 year old female with Pancreatic neuroendocrine tumor with duodenal ulcers  \par - BM costipation alt with diarrhea.\par - increased Somatulin to 120 mg q 4 weeks\par - Chromogranin 1044.> 5/19/20 - 740, 6/2020 666, 8/2020 684\par - MRI November 2019 stable pancreatic lesion- images and reports reviewed \par - 1/3/20 - CT abd/pelvis, showed stable pancreatic mass, focal ileus \par - Increase Protonix to 40 mg \par -Calcium 10.9 \par \par #Anemia\par Hemoglobin improved to 12 pt asymptomatic. \par Ferritn 406\par \par Vitamin B12 769 continue oral B12. \par \par Osteoporosis-  Reclast 5mg yearly, last dose 8/2020.\par \par Patient in assisted living after recent hip fx/ ORIF\par \par SCC of the scalp\par - recurrent lesions\par -derm follow up tomorrow\par - consider Libtayo\par \par Elevated BP- discuss with PCP, intermittent.\par Calcium - 10.9 today, repeat in 4 weeks with PTH  \par Hyponatremia- told to increase salt intake\par Office visit in 4  weeks or prn for new or worsening symptoms. \par Monitor CBC, Chem Profile, Chromogranin

## 2020-09-29 NOTE — HISTORY OF PRESENT ILLNESS
[de-identified] : Patient presents today for PNET and anemia, accompanied by her daughter.  She reports feeling well, but continues to have pain to her scalp since her last scraping treatment by Dr. Bhakta ~2 weeks ago. She has been taking tylenol to alleviate symptoms.  She denies n/v, fever, headaches, dizziness, chest pain/palpitation, rash, bleeding,  or GI symptoms, SOB/JOHNSON. [FreeTextEntry1] : New patient, multiple medical problems\par  [de-identified] : Patient is an 89 year old new consult for newly diagnosed PNET Pt.had a h/o abd pain and underwent an abdominal u/s on 04/25/2017.Impression showed patient was post cholecystectomy. There is dilation of the extrahepatic duct. Mild intrahepatic ductal dilation is also appreciated. Further evaluation recommended. Pt. then underwent an MRI of abd w/ w/o contrast that showed moderate intra and extrahepatic biliary ductal dilation and choledocholithiasis at least 3 common bile duct stones as described. A 2.3 x 2.3 uncinate process pancreatic mass with imaging features favoring a pancreatic neuroendocrine tumor rather than adenocarcinoma. Patient underwent a fine needle aspiration for cytology of the pancreatic uncinate process mass that showed well differentiated pancreatic neuroendocrine tumor. Immunohistochemical stains for chromogranin, synaptophysin and Ki-67 wer eperformed on the cell block. Chromogranin and synaphysin are positive and Ki-67 shows less that 3% staining. \par \par

## 2020-09-29 NOTE — PHYSICAL EXAM
[de-identified] : walks with walker [de-identified] : lesions to scalp covered by dressing [de-identified] : Frail appearing [de-identified] : decreased ROM [de-identified] : decreased pulmonary sounds [de-identified] : defered [de-identified] : non palpable

## 2020-09-29 NOTE — HEALTH RISK ASSESSMENT
[FreeTextEntry1] : her health [] : No [de-identified] : pending oncology [de-identified] : increased risk

## 2020-10-06 ENCOUNTER — APPOINTMENT (OUTPATIENT)
Dept: GERIATRICS | Facility: CLINIC | Age: 85
End: 2020-10-06
Payer: MEDICARE

## 2020-10-06 PROCEDURE — ZZZZZ: CPT

## 2020-10-27 ENCOUNTER — APPOINTMENT (OUTPATIENT)
Dept: HEMATOLOGY ONCOLOGY | Facility: CLINIC | Age: 85
End: 2020-10-27
Payer: MEDICARE

## 2020-10-27 ENCOUNTER — RESULT REVIEW (OUTPATIENT)
Age: 85
End: 2020-10-27

## 2020-10-27 VITALS
HEART RATE: 79 BPM | WEIGHT: 137 LBS | BODY MASS INDEX: 25.21 KG/M2 | DIASTOLIC BLOOD PRESSURE: 70 MMHG | RESPIRATION RATE: 24 BRPM | SYSTOLIC BLOOD PRESSURE: 181 MMHG | OXYGEN SATURATION: 97 % | HEIGHT: 62 IN | TEMPERATURE: 99.4 F

## 2020-10-27 DIAGNOSIS — D50.0 IRON DEFICIENCY ANEMIA SECONDARY TO BLOOD LOSS (CHRONIC): ICD-10-CM

## 2020-10-27 PROCEDURE — 99213 OFFICE O/P EST LOW 20 MIN: CPT

## 2020-10-27 NOTE — HISTORY OF PRESENT ILLNESS
[Therapy: ___] : Therapy: [unfilled] [1 - Distress Level] : Distress Level: 1 [de-identified] : Patient presents today for PNET and anemia, accompanied by her daughter.  She reports feeling stable no new issues since last visit  [FreeTextEntry1] : New patient, multiple medical problems\par  [de-identified] : Patient is an 89 year old new consult for newly diagnosed PNET Pt.had a h/o abd pain and underwent an abdominal u/s on 04/25/2017.Impression showed patient was post cholecystectomy. There is dilation of the extrahepatic duct. Mild intrahepatic ductal dilation is also appreciated. Further evaluation recommended. Pt. then underwent an MRI of abd w/ w/o contrast that showed moderate intra and extrahepatic biliary ductal dilation and choledocholithiasis at least 3 common bile duct stones as described. A 2.3 x 2.3 uncinate process pancreatic mass with imaging features favoring a pancreatic neuroendocrine tumor rather than adenocarcinoma. Patient underwent a fine needle aspiration for cytology of the pancreatic uncinate process mass that showed well differentiated pancreatic neuroendocrine tumor. Immunohistochemical stains for chromogranin, synaptophysin and Ki-67 wer eperformed on the cell block. Chromogranin and synaphysin are positive and Ki-67 shows less that 3% staining. \par \par

## 2020-10-27 NOTE — ASSESSMENT
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 \par Ferritn 489- Aug 2020\par \par Osteoporosis-  Reclast 5mg yearly, last dose 8/2020.\par \par Patient in assisted living after recent hip fx/ ORIF\par \par SCC of the scalp\par - recurrent lesions\par -derm follow up \par - consider Libtayo\par \par Elevated BP- discuss with PCP, intermittent repeat today 160/70 pt states she is only elevated here- asymptomatic\par Hyponatremia- told to increase salt intake- improved \par \par Office visit in 4  weeks or prn for new or worsening symptoms. \par Monitor CBC, Chem Profile, Chromogranin- case and falcon discussed with Dr. Jackson

## 2020-10-27 NOTE — PLAN
[FreeTextEntry1] : The patient is alert, aware of her medical problems\par pending oncology evaluation with dr Landry at [place]\par The patient educated about fall prevention\par

## 2020-10-27 NOTE — HEALTH RISK ASSESSMENT
[Good] : ~his/her~  mood as  good [Fair] : ~his/her~ current health as fair  [Intercurrent hospitalizations] : was admitted to the hospital  [Intercurrent ED visits] : went to ED [1] : 1) Little interest or pleasure doing things for several days (1) [FreeTextEntry1] : her health [] : No [de-identified] : pending oncology [de-identified] : increased risk

## 2020-10-27 NOTE — PHYSICAL EXAM
[Ambulatory and capable of all self care but unable to carry out any work activities] : Status 2- Ambulatory and capable of all self care but unable to carry out any work activities. Up and about more than 50% of waking hours [Normal] : affect appropriate [No Acute Distress] : no acute distress [Normal Outer Ear/Nose] : the outer ears and nose were normal in appearance [Normal Sclera/Conjunctiva] : normal sclera/conjunctiva [Normal Rate] : normal rate  [Clear to Auscultation] : lungs were clear to auscultation bilaterally [No Respiratory Distress] : no respiratory distress  [Memory Grossly Normal] : memory grossly normal [de-identified] : walks with walker [de-identified] : lesions to scalp covered by dressing [de-identified] : Frail appearing [de-identified] : decreased ROM [de-identified] : decreased pulmonary sounds [de-identified] : defered [de-identified] : non palpable

## 2020-10-27 NOTE — REVIEW OF SYSTEMS
[Skin Wound] : skin wound [Difficulty Walking] : difficulty walking [Hearing Loss] : hearing loss [Lower Ext Edema] : lower extremity edema [Unsteady Walking] : ataxia [Dizziness] : dizziness [Negative] : Heme/Lymph [Chills] : no chills [Dry Eyes] : no dryness of the eyes [Vision Problems] : no vision problems [Hoarseness] : no hoarseness [Mucosal Pain] : no mucosal pain [Vomiting] : no vomiting [Diarrhea] : no diarrhea [Muscle Pain] : no muscle pain [Confused] : no confusion [Insomnia] : no insomnia [Hot Flashes] : no hot flashes [FreeTextEntry4] : MAGUI [FreeTextEntry5] : stable [FreeTextEntry8] : nocturia [FreeTextEntry9] : s/p hip repair with marcella placement [de-identified] : scalp healing from recent scraping  [de-identified] : uses walker [Fever] : no fever [Fatigue] : no fatigue [Night Sweats] : no night sweats [Recent Change In Weight] : ~T no recent weight change [Earache] : no earache [Nosebleed] : no nosebleeds [Nasal Discharge] : no nasal discharge [Postnasal Drip] : no postnasal drip [Chest Pain] : no chest pain [Palpitations] : no palpitations [Paroysmal Nocturnal Dyspnea] : no paroysmal nocturnal dyspnea [Shortness Of Breath] : no shortness of breath [Cough] : no cough [Constipation] : no constipation [Diarrhea] : diarrhea [Dysuria] : no dysuria [Joint Pain] : no joint pain [Joint Swelling] : no joint swelling [Itching] : no itching [Skin Rash] : no skin rash [Headache] : no headache [Memory Loss] : no memory loss [Anxiety] : no anxiety [Depression] : no depression [Easy Bleeding] : no easy bleeding [Easy Bruising] : no easy bruising [FreeTextEntry2] : Frail looking elderly patient

## 2020-11-02 RX ORDER — ONDANSETRON 4 MG/1
4 TABLET ORAL
Qty: 30 | Refills: 0 | Status: ACTIVE | COMMUNITY
Start: 2020-11-02 | End: 1900-01-01

## 2020-11-07 ENCOUNTER — RX RENEWAL (OUTPATIENT)
Age: 85
End: 2020-11-07

## 2020-11-07 RX ORDER — CHLORHEXIDINE GLUCONATE 4 %
1000 LIQUID (ML) TOPICAL DAILY
Qty: 30 | Refills: 5 | Status: ACTIVE | COMMUNITY
Start: 2019-09-23 | End: 1900-01-01

## 2020-11-12 ENCOUNTER — APPOINTMENT (OUTPATIENT)
Dept: GERIATRICS | Facility: CLINIC | Age: 85
End: 2020-11-12
Payer: MEDICARE

## 2020-11-12 VITALS
HEART RATE: 65 BPM | RESPIRATION RATE: 20 BRPM | SYSTOLIC BLOOD PRESSURE: 125 MMHG | TEMPERATURE: 98.3 F | DIASTOLIC BLOOD PRESSURE: 64 MMHG

## 2020-11-12 DIAGNOSIS — Z87.898 PERSONAL HISTORY OF OTHER SPECIFIED CONDITIONS: ICD-10-CM

## 2020-11-12 DIAGNOSIS — L91.8 OTHER HYPERTROPHIC DISORDERS OF THE SKIN: ICD-10-CM

## 2020-11-12 DIAGNOSIS — K59.00 CONSTIPATION, UNSPECIFIED: ICD-10-CM

## 2020-11-12 DIAGNOSIS — L98.9 DISORDER OF THE SKIN AND SUBCUTANEOUS TISSUE, UNSPECIFIED: ICD-10-CM

## 2020-11-12 DIAGNOSIS — H61.23 IMPACTED CERUMEN, BILATERAL: ICD-10-CM

## 2020-11-12 NOTE — PHYSICAL EXAM
[General Appearance - Alert] : alert [General Appearance - In No Acute Distress] : in no acute distress [General Appearance - Well Nourished] : well nourished [General Appearance - Well Developed] : well developed [] : no respiratory distress [Respiration, Rhythm And Depth] : normal respiratory rhythm and effort [Apical Impulse] : the apical impulse was normal [Heart Rate And Rhythm] : heart rate was normal and rhythm regular [Abdomen Soft] : soft [Abdomen Tenderness] : non-tender [Hyperactive] : hyperactive [External Hemorrhoid] : external hemorrhoids [FreeTextEntry1] : hard stool high in rectal vault

## 2020-11-12 NOTE — HISTORY OF PRESENT ILLNESS
[FreeTextEntry1] : hx of constipation - takes lactulose daily\par also warm prune juice\par Has not had BM x 3 days

## 2020-11-12 NOTE — ASSESSMENT
[FreeTextEntry1] : Examined pt in her assisted living home due to homebound status and restrictions\par Pt given one suppository per rectum with no incidence pain or concern\par \par Continue current bowel regimen\par Seeing GI specialist tomorrow on 11/13, please discuss all and poss changes\par to current bowel regimen. Pt verbalized understanding\par Floor nurse aware\par

## 2020-11-13 ENCOUNTER — APPOINTMENT (OUTPATIENT)
Dept: ENDOCRINOLOGY | Facility: CLINIC | Age: 85
End: 2020-11-13
Payer: MEDICARE

## 2020-11-13 VITALS
DIASTOLIC BLOOD PRESSURE: 64 MMHG | BODY MASS INDEX: 26.2 KG/M2 | WEIGHT: 137 LBS | HEART RATE: 81 BPM | OXYGEN SATURATION: 98 % | SYSTOLIC BLOOD PRESSURE: 136 MMHG | HEIGHT: 60.75 IN

## 2020-11-13 DIAGNOSIS — E34.9 ENDOCRINE DISORDER, UNSPECIFIED: ICD-10-CM

## 2020-11-13 PROCEDURE — 99205 OFFICE O/P NEW HI 60 MIN: CPT

## 2020-11-13 RX ORDER — ASPIRIN ENTERIC COATED TABLETS 81 MG 81 MG/1
81 TABLET, DELAYED RELEASE ORAL
Qty: 30 | Refills: 5 | Status: DISCONTINUED | COMMUNITY
Start: 2019-12-17 | End: 2020-11-13

## 2020-11-16 ENCOUNTER — RESULT REVIEW (OUTPATIENT)
Age: 85
End: 2020-11-16

## 2020-11-16 NOTE — HISTORY OF PRESENT ILLNESS
[FreeTextEntry1] : Ms. MARGERY WECHSLER is a 89 year oldwoman who presents with concerns of hypercalcemia\par This was diagnosed on blood work per oncology \par has polyuria, mild polydipsia, alternating diarrhea and constipation \par family h/o hypercalcemia - not sure \par use of HCTZ - none \par h/o renal stones -no \par she  has had fractures h/o ? right femur fracture , last year \par Patient has had Reclast treatments per oncology.  The last one was in August 2020.  She has had treatment last treatment prior to that.  Important to note she has had right hip fracture in spite of Reclast treatment.\par  h/o malignancies -PNET \par  h/o radiation treatment - yes for SCC , 2 treatments \par Calcium intake \par Dietary 1-2 servings a day \par supplemental \par Vit D intake - 1000 Iu daily \par h/o renal dysfunction ,low GFR noted occasionally \par on PPI  \par \par

## 2020-11-17 ENCOUNTER — RESULT REVIEW (OUTPATIENT)
Age: 85
End: 2020-11-17

## 2020-11-20 ENCOUNTER — NON-APPOINTMENT (OUTPATIENT)
Age: 85
End: 2020-11-20

## 2020-11-20 ENCOUNTER — APPOINTMENT (OUTPATIENT)
Dept: FAMILY MEDICINE | Facility: ASSISTED LIVING FACILITY | Age: 85
End: 2020-11-20
Payer: MEDICARE

## 2020-11-20 DIAGNOSIS — R29.6 REPEATED FALLS: ICD-10-CM

## 2020-11-24 ENCOUNTER — APPOINTMENT (OUTPATIENT)
Dept: HEMATOLOGY ONCOLOGY | Facility: CLINIC | Age: 85
End: 2020-11-24

## 2020-12-21 ENCOUNTER — APPOINTMENT (OUTPATIENT)
Dept: ENDOCRINOLOGY | Facility: CLINIC | Age: 85
End: 2020-12-21

## 2020-12-30 ENCOUNTER — NON-APPOINTMENT (OUTPATIENT)
Age: 85
End: 2020-12-30

## 2020-12-31 ENCOUNTER — APPOINTMENT (OUTPATIENT)
Dept: FAMILY MEDICINE | Facility: ASSISTED LIVING FACILITY | Age: 85
End: 2020-12-31
Payer: MEDICARE

## 2020-12-31 DIAGNOSIS — D64.9 ANEMIA, UNSPECIFIED: ICD-10-CM

## 2020-12-31 DIAGNOSIS — I25.10 ATHEROSCLEROTIC HEART DISEASE OF NATIVE CORONARY ARTERY W/OUT ANGINA PECTORIS: ICD-10-CM

## 2021-01-14 ENCOUNTER — RESULT REVIEW (OUTPATIENT)
Age: 86
End: 2021-01-14

## 2021-01-14 ENCOUNTER — APPOINTMENT (OUTPATIENT)
Dept: HEMATOLOGY ONCOLOGY | Facility: CLINIC | Age: 86
End: 2021-01-14
Payer: MEDICARE

## 2021-01-14 VITALS
SYSTOLIC BLOOD PRESSURE: 86 MMHG | HEIGHT: 60.75 IN | RESPIRATION RATE: 24 BRPM | DIASTOLIC BLOOD PRESSURE: 53 MMHG | TEMPERATURE: 99.1 F | OXYGEN SATURATION: 96 % | HEART RATE: 90 BPM

## 2021-01-14 DIAGNOSIS — M81.0 AGE-RELATED OSTEOPOROSIS W/OUT CURRENT PATHOLOGICAL FRACTURE: ICD-10-CM

## 2021-01-14 DIAGNOSIS — N18.30 CHRONIC KIDNEY DISEASE, STAGE 3 UNSPECIFIED: ICD-10-CM

## 2021-01-14 DIAGNOSIS — D50.9 IRON DEFICIENCY ANEMIA, UNSPECIFIED: ICD-10-CM

## 2021-01-14 DIAGNOSIS — Z98.890 OTHER SPECIFIED POSTPROCEDURAL STATES: ICD-10-CM

## 2021-01-14 PROCEDURE — 99214 OFFICE O/P EST MOD 30 MIN: CPT

## 2021-01-14 NOTE — ASSESSMENT
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 \par Ferritn 489- Aug 2020\par \par Osteoporosis-  Reclast 5mg yearly, last dose 8/2020.\par \par Patient in assisted living after recent hip fx/ ORIF\par \par SCC of the scalp\par - recurrent lesions\par -derm follow up \par - consider Libtayo\par \par 1/14/2021-\par - Pt sent to ER during visit- noted to be tachycardic, breathing slightly labored, hypotensive, WBC above 20\par - she did not receive Somatuline today\par - report given to ER and spoke with daughter \par \par \par Office visit in 4  weeks or prn for new or worsening symptoms. \par Monitor CBC, Chem Profile, Chromogranin- case and falcon discussed with Dr. Jackson

## 2021-01-14 NOTE — HISTORY OF PRESENT ILLNESS
[Therapy: ___] : Therapy: [unfilled] [1 - Distress Level] : Distress Level: 1 [de-identified] : Patient presents today for PNET and anemia, accompanied by her friend.  She is lethargic, and has some labored breathing  [FreeTextEntry1] : New patient, multiple medical problems\par  [de-identified] : Patient is an 89 year old new consult for newly diagnosed PNET Pt.had a h/o abd pain and underwent an abdominal u/s on 04/25/2017.Impression showed patient was post cholecystectomy. There is dilation of the extrahepatic duct. Mild intrahepatic ductal dilation is also appreciated. Further evaluation recommended. Pt. then underwent an MRI of abd w/ w/o contrast that showed moderate intra and extrahepatic biliary ductal dilation and choledocholithiasis at least 3 common bile duct stones as described. A 2.3 x 2.3 uncinate process pancreatic mass with imaging features favoring a pancreatic neuroendocrine tumor rather than adenocarcinoma. Patient underwent a fine needle aspiration for cytology of the pancreatic uncinate process mass that showed well differentiated pancreatic neuroendocrine tumor. Immunohistochemical stains for chromogranin, synaptophysin and Ki-67 wer eperformed on the cell block. Chromogranin and synaphysin are positive and Ki-67 shows less that 3% staining. \par \par

## 2021-01-14 NOTE — REVIEW OF SYSTEMS
[Skin Wound] : skin wound [Difficulty Walking] : difficulty walking [Hearing Loss] : hearing loss [Lower Ext Edema] : lower extremity edema [Dizziness] : dizziness [Unsteady Walking] : ataxia [Negative] : Heme/Lymph [Chills] : no chills [Dry Eyes] : no dryness of the eyes [Vision Problems] : no vision problems [Hoarseness] : no hoarseness [Mucosal Pain] : no mucosal pain [Vomiting] : no vomiting [Diarrhea] : no diarrhea [Muscle Pain] : no muscle pain [Confused] : no confusion [Insomnia] : no insomnia [Hot Flashes] : no hot flashes [FreeTextEntry4] : MAGUI [FreeTextEntry5] : stable [FreeTextEntry8] : nocturia [FreeTextEntry9] : s/p hip repair with marcella placement [de-identified] : scalp healing from recent scraping  [de-identified] : uses walker [Fever] : no fever [Fatigue] : no fatigue [Night Sweats] : no night sweats [Recent Change In Weight] : ~T no recent weight change [Earache] : no earache [Nosebleed] : no nosebleeds [Postnasal Drip] : no postnasal drip [Nasal Discharge] : no nasal discharge [Chest Pain] : no chest pain [Palpitations] : no palpitations [Paroysmal Nocturnal Dyspnea] : no paroysmal nocturnal dyspnea [Shortness Of Breath] : no shortness of breath [Cough] : no cough [Constipation] : no constipation [Diarrhea] : diarrhea [Dysuria] : no dysuria [Joint Pain] : no joint pain [Joint Swelling] : no joint swelling [Itching] : no itching [Skin Rash] : no skin rash [Headache] : no headache [Memory Loss] : no memory loss [Anxiety] : no anxiety [Depression] : no depression [Easy Bleeding] : no easy bleeding [Easy Bruising] : no easy bruising [FreeTextEntry2] : Frail looking elderly patient

## 2021-01-14 NOTE — HEALTH RISK ASSESSMENT
[Fair] : ~his/her~ current health as fair  [Good] : ~his/her~  mood as  good [Intercurrent ED visits] : went to ED [Intercurrent hospitalizations] : was admitted to the hospital  [1] : 2) Feeling down, depressed, or hopeless for several days (1) [FreeTextEntry1] : her health [] : No [de-identified] : pending oncology [de-identified] : increased risk

## 2021-01-14 NOTE — PLAN
[FreeTextEntry1] : The patient is alert, aware of her medical problems\par pending oncology evaluation with dr Landry at [place]\par The patient educated about fall prevention\par

## 2021-01-14 NOTE — PHYSICAL EXAM
[Ambulatory and capable of all self care but unable to carry out any work activities] : Status 2- Ambulatory and capable of all self care but unable to carry out any work activities. Up and about more than 50% of waking hours [Normal] : affect appropriate [No Acute Distress] : no acute distress [Normal Sclera/Conjunctiva] : normal sclera/conjunctiva [Normal Outer Ear/Nose] : the outer ears and nose were normal in appearance [No Respiratory Distress] : no respiratory distress  [Clear to Auscultation] : lungs were clear to auscultation bilaterally [Normal Rate] : normal rate  [Memory Grossly Normal] : memory grossly normal [de-identified] : walks with walker [de-identified] : lesions to scalp covered by dressing [de-identified] : Frail appearing [de-identified] : decreased ROM [de-identified] : decreased pulmonary sounds [de-identified] : defered [de-identified] : non palpable

## 2021-01-15 ENCOUNTER — NON-APPOINTMENT (OUTPATIENT)
Age: 86
End: 2021-01-15

## 2021-01-22 ENCOUNTER — APPOINTMENT (OUTPATIENT)
Dept: FAMILY MEDICINE | Facility: ASSISTED LIVING FACILITY | Age: 86
End: 2021-01-22
Payer: MEDICARE

## 2021-01-22 DIAGNOSIS — R11.2 NAUSEA WITH VOMITING, UNSPECIFIED: ICD-10-CM

## 2021-01-22 DIAGNOSIS — D3A.8 OTHER BENIGN NEUROENDOCRINE TUMORS: ICD-10-CM

## 2021-01-22 DIAGNOSIS — R54 AGE-RELATED PHYSICAL DEBILITY: ICD-10-CM

## 2021-01-22 DIAGNOSIS — R10.9 UNSPECIFIED ABDOMINAL PAIN: ICD-10-CM

## 2021-01-25 PROBLEM — R54 FRAIL ELDERLY: Status: ACTIVE | Noted: 2018-12-21

## 2021-01-25 PROBLEM — R11.2 NAUSEA AND VOMITING: Status: ACTIVE | Noted: 2021-01-25

## 2021-01-25 PROBLEM — D3A.8 PRIMARY PANCREATIC NEUROENDOCRINE TUMOR: Status: ACTIVE | Noted: 2017-06-01

## 2021-01-25 PROBLEM — R10.9 ABDOMINAL PAIN, UNSPECIFIED ABDOMINAL LOCATION: Status: ACTIVE | Noted: 2019-11-18

## 2021-04-22 NOTE — RESULTS/DATA
Report received and assumed care of patient.   Plan of care discussed. Pain management plan discussed. Patient will notify RN when she would like pain medication.  Patient encouraged to call for any needs.   [FreeTextEntry1] : Office visit on April 21, 2019\par  WBC 6.4\par Hemoglobin 11.7\par Hematocrit 37.2\par Platelets 273,000\par \par Chromogranin A 744 (prev 678)  \par Ferritin 330\par Vit B12 632

## 2022-07-07 NOTE — DISCUSSION/SUMMARY
[FreeTextEntry1] : Patient's daughter called to report that patient had fallen while going to her mailbox and being distracted. She denies loss of consciousness or sustained injury. She was advised to monitor and consider ED if complications develop.  She will follow up with her PCP next week.
Yes

## 2022-10-21 NOTE — RESULTS/DATA
You can access the FollowMyHealth Patient Portal offered by [facility] by registering at the following website: [email or web address]. By joining [facility]’s FollowMyHealth portal, you will also be able to view your health information using other applications (apps) compatible with our system. [FreeTextEntry1] : 12/27/18\par WBC 6.1\par Hemoglobin 10.9\par Hematocrit 33.9\par Platelets 307,000

## 2024-03-20 NOTE — REVIEW OF SYSTEMS
[facility] - [facility] Tele[facility]  Gastroenterology  Consult Note    Patient Name: Anuradha Godfrey  MRN: [id number]  Admission Date: 3/20/2024  Hospital Length of Stay: 0 days  Code Status: Full Code   Attending Provider: Colten Paula MD   Consulting Provider: Ronald Benitez MD  Primary Care Physician: Munir Garcia MD  Principal Problem:Symptomatic anemia    Inpatient consult to Gastroenterology  Consult performed by: Ronald Benitez MD  Consult ordered by: Colten Paula MD        Subjective:     HPI: CC: anemia  GI consult was received re: iron deficiency anemia which was incidentally found with labs. She c/o fatigue over three weeks prior to admission, but denies gross gi bleeding. She has some dysphagia. She has family history of colon cancer. She has been on asa, plavix, eliquis for cardiovascular disease..    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    Gastroesophageal reflux disease without esophagitis 07/18/2017    History of myocardial infarction 07/13/2014    Hypercholesterolemia 01/18/2016    Hypertension 07/15/2020    Insufficiency, arterial, peripheral 08/01/2019    Multi-vessel coronary artery stenosis 08/01/2019    Occlusion and stenosis of unspecified carotid artery 07/26/2019    Peripheral arterial occlusive disease 12/15/2016    Peripheral vascular disease 11/12/2020    Venous insufficiency (chronic) (peripheral) 10/15/2015       Past Surgical History:   Procedure Laterality Date    APPENDECTOMY Right     CARDIAC CATHETERIZATION  02/04/2021    PCI to prox LAD; IVUS  of LAD    CHOLECYSTECTOMY      OOPHORECTOMY      TOTAL ABDOMINAL HYSTERECTOMY         Review of patient's allergies indicates:   Allergen Reactions    Bee sting [allergen ext-venom-honey bee] Anaphylaxis    Nsaids (non-steroidal anti-inflammatory drug) Anaphylaxis    Grass pollen-alyssa, standard     Neurontin [gabapentin] Hallucinations    Topiramate      Other reaction(s): Unknown     Family History       Problem Relation (Age of Onset)    Heart disease Mother, Brother    Stroke Mother          Tobacco Use    Smoking status: Never    Smokeless tobacco: Never   Substance and Sexual Activity    Alcohol use: Not Currently    Drug use: Never    Sexual activity: Not Currently     Review of Systems   Respiratory:  Positive for cough.    Cardiovascular: Negative.    Gastrointestinal: Negative.    Musculoskeletal:  Positive for gait problem.     Objective:     Vital Signs (Most Recent):  Temp: 98.1 °F (36.7 °C) (03/20/24 1023)  Pulse: 87 (03/20/24 1344)  Resp: 16 (03/20/24 1351)  BP: (!) 158/71 (03/20/24 1344)  SpO2: 100 % (03/20/24 1344) Vital Signs (24h Range):  Temp:  [98.1 °F (36.7 °C)] 98.1 °F (36.7 °C)  Pulse:  [75-87] 87  Resp:  [11-18] 16  SpO2:  [95 %-100 %] 100 %  BP: (136-162)/() 158/71     Weight: 86.3 kg (190 lb 4.8 oz) (03/20/24 1645)  Body mass index is 29.81 kg/m².    No intake or output data in the 24 hours ending 03/20/24 1710    Lines/Drains/Airways       Peripheral Intravenous Line  Duration                  Peripheral IV - Single Lumen 03/20/24 1109 20 G Posterior;Right Wrist <1 day                    Physical Exam  Constitutional:       General: She is not in acute distress.     Appearance: Normal appearance. She is well-developed. She is not ill-appearing.   HENT:      Head: Normocephalic and atraumatic.      Nose: Nose normal.   Eyes:      Pupils: Pupils are equal, round, and reactive to light.   Cardiovascular:      Rate and Rhythm: Normal rate and regular rhythm.   Pulmonary:      Effort:  Pulmonary effort is normal.      Breath sounds: Wheezing present.   Abdominal:      General: Abdomen is flat. Bowel sounds are normal. There is no distension.      Palpations: Abdomen is soft.      Tenderness: There is no abdominal tenderness. There is no guarding.   Musculoskeletal:      Comments: Left aka   Skin:     General: Skin is warm and dry.      Coloration: Skin is pale. Skin is not jaundiced.   Neurological:      Mental Status: She is alert.   Psychiatric:         Attention and Perception: Attention normal.         Mood and Affect: Affect normal.         Speech: Speech normal.         Behavior: Behavior is cooperative.      Comments: Pt was calm while speaking.         Significant Labs:  CBC:   Recent Labs   Lab 03/20/24  1023   WBC 9.21   HGB 6.0*   HCT 23.3*        CMP:   Recent Labs   Lab 03/20/24  1023   *   CALCIUM 8.7   ALBUMIN 2.8*   PROT 7.2      K 2.9*   CO2 27   *   BUN 7   CREATININE 0.57   ALKPHOS 103   ALT 18   AST 22   BILITOT 0.3       Significant Imaging:  Imaging results within the past 24 hours have been reviewed.    Assessment/Plan:     Active Diagnoses:    Diagnosis Date Noted POA    PRINCIPAL PROBLEM:  Symptomatic anemia [D64.9] 03/20/2024 Yes    Hypokalemia [E87.6] 03/20/2024 Yes    Chronic pain syndrome [G89.4] 03/20/2024 Yes    Primary insomnia [F51.01] 03/20/2024 Yes    Gastroesophageal reflux disease [K21.9] 06/26/2023 Yes    Essential (primary) hypertension [I10] 11/17/2022 Yes    Diabetic peripheral neuropathy [E11.42] 09/24/2021 Yes    Peripheral artery disease [I73.9] 05/05/2021 Yes    Type 2 diabetes mellitus, without long-term current use of insulin [E11.9] 04/13/2021 Yes    Atherosclerosis of native coronary artery of native heart without angina pectoris [I25.10] 04/13/2021 Yes      Problems Resolved During this Admission:       Imp: iron deficiency anemia, esophageal dysphagia, vascular disease requiring anticoagulation therapy.  Rec: I agree with  pRBC transfusion. I ordered IV iron and plan EGD with dilation of the esophagus tomorrow.    Thank you for your consult. I will follow-up with patient. Please contact us if you have any additional questions.    Ronald Benitez MD  Gastroenterology  [facility] - [address] [place] [address]     [Hearing Loss] : hearing loss [Dizziness] : dizziness [Unsteady Walking] : ataxia [Negative] : Heme/Lymph [Chills] : no chills [Dry Eyes] : no dryness of the eyes [Vision Problems] : no vision problems [Hoarseness] : no hoarseness [Mucosal Pain] : no mucosal pain [Lower Ext Edema] : no lower extremity edema [Vomiting] : no vomiting [Diarrhea] : no diarrhea [Muscle Pain] : no muscle pain [Skin Wound] : no skin wound [Confused] : no confusion [Difficulty Walking] : no difficulty walking [Insomnia] : no insomnia [Hot Flashes] : no hot flashes [FreeTextEntry3] : glasses [FreeTextEntry4] : MAGUI [FreeTextEntry7] : nausea [FreeTextEntry8] : nocturia [FreeTextEntry9] : s/p hip repair with marcella placement [de-identified] : 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 [de-identified] : Numbness in right hand thumb and pointer in a.m. [Fever] : no fever [Fatigue] : no fatigue [Night Sweats] : no night sweats [Recent Change In Weight] : ~T no recent weight change [Earache] : no earache [Nosebleed] : no nosebleeds [Nasal Discharge] : no nasal discharge [Postnasal Drip] : no postnasal drip [Chest Pain] : no chest pain [Palpitations] : no palpitations [Paroysmal Nocturnal Dyspnea] : no paroysmal nocturnal dyspnea [Shortness Of Breath] : no shortness of breath [Cough] : no cough [Constipation] : no constipation [Diarrhea] : diarrhea [Dysuria] : no dysuria [Joint Pain] : no joint pain [Joint Swelling] : no joint swelling [Itching] : no itching [Skin Rash] : no skin rash [Headache] : no headache [Memory Loss] : no memory loss [Anxiety] : no anxiety [Depression] : no depression [Easy Bleeding] : no easy bleeding [Easy Bruising] : no easy bruising [FreeTextEntry2] : Frail looking elderly patient

## 2024-04-10 NOTE — PLAN
[FreeTextEntry1] : The patient is alert, aware of her medical problems\par pending oncology evaluation with dr Landry at [place]\par The patient educated about fall prevention\par 
alert and oriented x3/normal behavior